# Patient Record
Sex: FEMALE | Race: BLACK OR AFRICAN AMERICAN | NOT HISPANIC OR LATINO | Employment: FULL TIME | URBAN - METROPOLITAN AREA
[De-identification: names, ages, dates, MRNs, and addresses within clinical notes are randomized per-mention and may not be internally consistent; named-entity substitution may affect disease eponyms.]

---

## 2019-05-17 ENCOUNTER — OFFICE VISIT (OUTPATIENT)
Dept: FAMILY MEDICINE CLINIC | Facility: CLINIC | Age: 49
End: 2019-05-17

## 2019-05-17 VITALS
HEIGHT: 66 IN | BODY MASS INDEX: 32.47 KG/M2 | HEART RATE: 60 BPM | WEIGHT: 202 LBS | SYSTOLIC BLOOD PRESSURE: 118 MMHG | DIASTOLIC BLOOD PRESSURE: 76 MMHG | TEMPERATURE: 98.1 F

## 2019-05-17 DIAGNOSIS — Z76.89 REFERRAL OF PATIENT: ICD-10-CM

## 2019-05-17 DIAGNOSIS — F17.210 CIGARETTE SMOKER: Primary | ICD-10-CM

## 2019-05-17 DIAGNOSIS — J45.909 MILD ASTHMA, UNSPECIFIED WHETHER COMPLICATED, UNSPECIFIED WHETHER PERSISTENT: ICD-10-CM

## 2019-05-17 DIAGNOSIS — E66.09 CLASS 1 OBESITY DUE TO EXCESS CALORIES WITHOUT SERIOUS COMORBIDITY WITH BODY MASS INDEX (BMI) OF 33.0 TO 33.9 IN ADULT: ICD-10-CM

## 2019-05-17 PROCEDURE — 99204 OFFICE O/P NEW MOD 45 MIN: CPT | Performed by: NURSE PRACTITIONER

## 2019-05-17 PROCEDURE — 99406 BEHAV CHNG SMOKING 3-10 MIN: CPT | Performed by: NURSE PRACTITIONER

## 2019-05-17 RX ORDER — BUPROPION HYDROCHLORIDE 75 MG/1
75 TABLET ORAL 2 TIMES DAILY
Qty: 60 TABLET | Refills: 3 | Status: SHIPPED | OUTPATIENT
Start: 2019-05-17 | End: 2020-02-06

## 2019-05-17 RX ORDER — ALBUTEROL SULFATE 90 UG/1
2 AEROSOL, METERED RESPIRATORY (INHALATION) EVERY 4 HOURS PRN
COMMUNITY
End: 2019-05-17 | Stop reason: SDUPTHER

## 2019-05-17 RX ORDER — MULTIPLE VITAMINS W/ MINERALS TAB 9MG-400MCG
1 TAB ORAL DAILY
COMMUNITY
End: 2021-12-30

## 2019-05-17 RX ORDER — LANOLIN ALCOHOL/MO/W.PET/CERES
1000 CREAM (GRAM) TOPICAL DAILY
COMMUNITY

## 2019-05-17 NOTE — PROGRESS NOTES
Francisca Adkins presents today to establish care.    Chief Complaint   Patient presents with   • Establish Care     New patient she comes in today to discuss labs that she had done at a health screening at work.        HPI   Francisca today to establish care.  Recently had lab work completed at her work and wanted to review results.  There were some concerning elevations that she is very worried about.  She is accompanied by daughter-in-law and cousin and grandson at bedside.    Currently no concerns today other than she is a cigarette smoker and would like to quit smoking.  She does have a history of mild asthma that she would like an albuterol inhaler refilled.  Occasionally has to use her albuterol inhaler especially with seasonal allergies.  Currently is taking a multivitamin, B12 vitamin, and hair skin and nails vitamin.    Cigarette smoker  Chronic.  Stable.  Has tried to quit in the past with Lalitha, Chantix, and cold turkey.  Did well with Chantix for 2 months but it was too expensive.  She is willing to try anything to help her quit.  She is ready to quit.  Lives with her aunt who is a smoker and does not want to quit.  States that she has problems with cravings, typically smokes during her lunch break and craves her cigarette right before lunch.  When she was on Chantix she did not have these cravings and did well.    Health Maintenance:  -Sees opthalmology regularly, requesting dental referral.  -not currently sexually active  -Hx of tubal ligation  -Use of seatbelt 100% of the time  -Smoke/CO detector working in home  -smoker; ready to quit  -Vaccines UTD- refused Tdap today        PHQ-2/PHQ-9 Depression Screening 5/17/2019   Little interest or pleasure in doing things 0   Feeling down, depressed, or hopeless 0   Total Score 0       Review of Systems   Constitutional: Negative for activity change, fatigue, unexpected weight gain and unexpected weight loss.   HENT: Negative for congestion.    Eyes:  Negative for blurred vision and visual disturbance.   Respiratory: Negative for chest tightness, shortness of breath and wheezing.    Cardiovascular: Negative for chest pain, palpitations and leg swelling.   Gastrointestinal: Negative for constipation, diarrhea, nausea, vomiting and GERD.   Musculoskeletal: Negative for arthralgias.   Neurological: Negative for dizziness, light-headedness, headache and confusion.   Psychiatric/Behavioral: Positive for stress. Negative for depressed mood. The patient is not nervous/anxious.    All other systems reviewed and are negative.       Past Medical History:   Diagnosis Date   • Asthma    • Depression    • GERD (gastroesophageal reflux disease)    • Headache    • Ovarian cyst         Past Surgical History:   Procedure Laterality Date   • BARIATRIC SURGERY     • CHOLECYSTECTOMY     • TONSILLECTOMY     • TUBAL ABDOMINAL LIGATION          Family History   Problem Relation Age of Onset   • Arthritis Mother    • Diabetes Paternal Aunt    • Heart attack Maternal Grandmother    • Hypertension Maternal Grandmother    • Stroke Maternal Grandmother         Social History     Socioeconomic History   • Marital status:      Spouse name: Not on file   • Number of children: Not on file   • Years of education: Not on file   • Highest education level: Not on file   Occupational History     Employer: [employer]   Tobacco Use   • Smoking status: Current Every Day Smoker     Packs/day: 1.00   • Smokeless tobacco: Never Used   Substance and Sexual Activity   • Alcohol use: Yes     Alcohol/week: 1.8 oz     Types: 3 Shots of liquor per week   • Drug use: No        Current Outpatient Medications on File Prior to Visit   Medication Sig Dispense Refill   • Multiple Vitamins-Minerals (HAIR SKIN AND NAILS FORMULA PO) Take 2 doses by mouth Daily.     • Multiple Vitamins-Minerals (MULTIVITAMIN WITH MINERALS) tablet tablet Take 1 tablet by mouth Daily.     • vitamin B-12 (CYANOCOBALAMIN) 1000  "MCG tablet Take 1,000 mcg by mouth Daily.     • [DISCONTINUED] albuterol sulfate  (90 Base) MCG/ACT inhaler Inhale 2 puffs Every 4 (Four) Hours As Needed.       No current facility-administered medications on file prior to visit.        No Known Allergies     Visit Vitals  /76   Pulse 60   Temp 98.1 °F (36.7 °C)   Ht 166.4 cm (65.5\")   Wt 91.6 kg (202 lb)   BMI 33.10 kg/m²        Physical Exam   Constitutional: She is oriented to person, place, and time. Vital signs are normal. She appears well-developed and well-nourished. She appears overweight.   HENT:   Head: Normocephalic.   Right Ear: Hearing, tympanic membrane, external ear and ear canal normal.   Left Ear: Hearing, tympanic membrane, external ear and ear canal normal.   Nose: Nose normal.   Mouth/Throat: Uvula is midline and oropharynx is clear and moist.   Eyes: Lids are normal.   Cardiovascular: Normal rate, regular rhythm, normal heart sounds and intact distal pulses.   Pulmonary/Chest: Effort normal and breath sounds normal.   Abdominal: Soft. Bowel sounds are normal.   Musculoskeletal: Normal range of motion.   Lymphadenopathy:        Head (right side): No submental, no submandibular, no tonsillar, no preauricular, no posterior auricular and no occipital adenopathy present.        Head (left side): No submental, no submandibular, no tonsillar, no preauricular, no posterior auricular and no occipital adenopathy present.     She has no cervical adenopathy.   Neurological: She is alert and oriented to person, place, and time. GCS eye subscore is 4. GCS verbal subscore is 5. GCS motor subscore is 6.   Skin: Skin is warm, dry and intact.   Psychiatric: She has a normal mood and affect. Her speech is normal and behavior is normal. Judgment and thought content normal.   Nursing note and vitals reviewed.       No results found for this or any previous visit.     Assessment/Plan  Francisca was seen today for establish care.    Diagnoses and all " orders for this visit:    Cigarette smoker  -     buPROPion (WELLBUTRIN) 75 MG tablet; Take 1 tablet by mouth 2 (Two) Times a Day.  -Smoking cessation completed.  Counseling was given to Francisca Adkins for the following topics: smoking cessation . Total time of the encounter was 30 minutes and 8 minutes was spent counseling.,    Mild asthma, unspecified whether complicated, unspecified whether persistent  -     albuterol (PROAIR RESPICLICK) 108 (90 Base) MCG/ACT inhaler; Inhale 2 puffs Every 4 (Four) Hours As Needed for Shortness of Air.    Class 1 obesity due to excess calories without serious comorbidity with body mass index (BMI) of 33.0 to 33.9 in adult  Patient's Body mass index is 33.1 kg/m². BMI is above normal parameters. Recommendations include: educational material, exercise counseling, nutrition counseling and pharmacological intervention.  -History of bariatric surgery.  Got down to 130 pounds.  Recently gained about 50 pounds in the last year when she began working night shift.    Referral of patient  -     Ambulatory Referral to Dentistry        I have reviewed lab results from her work, I see nothing concerning, overall she looks good.  They completed a CBC with differential, CMP, TSH, and lipid panel.  All within normal limits.  I have reviewed and reassured lab work and vital signs with her.    Return in about 3 months (around 8/17/2019) for Annual WWE with pap.

## 2019-05-17 NOTE — PATIENT INSTRUCTIONS
Mediterranean Diet  A Mediterranean diet refers to food and lifestyle choices that are based on the traditions of countries located on the [place]. This way of eating has been shown to help prevent certain conditions and improve outcomes for people who have chronic diseases, like kidney disease and heart disease.  What are tips for following this plan?  Lifestyle  · Cook and eat meals together with your family, when possible.  · Drink enough fluid to keep your urine clear or pale yellow.  · Be physically active every day. This includes:  ? Aerobic exercise like running or swimming.  ? Leisure activities like gardening, walking, or housework.  · Get 7-8 hours of sleep each night.  · If recommended by your health care provider, drink red wine in moderation. This means 1 glass a day for nonpregnant women and 2 glasses a day for men. A glass of wine equals 5 oz (150 mL).  Reading food labels  · Check the serving size of packaged foods. For foods such as rice and pasta, the serving size refers to the amount of cooked product, not dry.  · Check the total fat in packaged foods. Avoid foods that have saturated fat or trans fats.  · Check the ingredients list for added sugars, such as corn syrup.  Shopping  · At the grocery store, buy most of your food from the areas near the walls of the store. This includes:  ? Fresh fruits and vegetables (produce).  ? Grains, beans, nuts, and seeds. Some of these may be available in unpackaged forms or large amounts (in bulk).  ? Fresh seafood.  ? Poultry and eggs.  ? Low-fat dairy products.  · Buy whole ingredients instead of prepackaged foods.  · Buy fresh fruits and vegetables in-season from local farmers markets.  · Buy frozen fruits and vegetables in resealable bags.  · If you do not have access to quality fresh seafood, buy precooked frozen shrimp or canned fish, such as tuna, salmon, or sardines.  · Buy small amounts of raw or cooked vegetables, salads, or olives from the  deli or salad bar at your store.  · Stock your pantry so you always have certain foods on hand, such as olive oil, canned tuna, canned tomatoes, rice, pasta, and beans.  Cooking  · Cook foods with extra-virgin olive oil instead of using butter or other vegetable oils.  · Have meat as a side dish, and have vegetables or grains as your main dish. This means having meat in small portions or adding small amounts of meat to foods like pasta or stew.  · Use beans or vegetables instead of meat in common dishes like chili or lasagna.  · [place] with different cooking methods. Try roasting or broiling vegetables instead of steaming or sautéeing them.  · Add frozen vegetables to soups, stews, pasta, or rice.  · Add nuts or seeds for added healthy fat at each meal. You can add these to yogurt, salads, or vegetable dishes.  · Marinate fish or vegetables using olive oil, lemon juice, garlic, and fresh herbs.  Meal planning  · Plan to eat 1 vegetarian meal one day each week. Try to work up to 2 vegetarian meals, if possible.  · Eat seafood 2 or more times a week.  · Have healthy snacks readily available, such as:  ? Vegetable sticks with hummus.  ? Greek yogurt.  ? Fruit and nut trail mix.  · Eat balanced meals throughout the week. This includes:  ? Fruit: 2-3 servings a day  ? Vegetables: 4-5 servings a day  ? Low-fat dairy: 2 servings a day  ? Fish, poultry, or lean meat: 1 serving a day  ? Beans and legumes: 2 or more servings a week  ? Nuts and seeds: 1-2 servings a day  ? Whole grains: 6-8 servings a day  ? Extra-virgin olive oil: 3-4 servings a day  · Limit red meat and sweets to only a few servings a month  What are my food choices?  · Mediterranean diet  ? Recommended  ? Grains: Whole-grain pasta. Brown rice. Bulgar wheat. Polenta. Couscous. Whole-wheat bread. Oatmeal. Quinoa.  ? Vegetables: Artichokes. Beets. Broccoli. Cabbage. Carrots. Eggplant. Green beans. Chard. Kale. Spinach. Onions. Leeks. Peas. Squash.  Tomatoes. Peppers. Radishes.  ? Fruits: Apples. Apricots. Avocado. Berries. Bananas. Cherries. Dates. Figs. Grapes. Ning. Melon. Oranges. Peaches. Plums. Pomegranate.  ? Meats and other protein foods: Beans. Almonds. Sunflower seeds. Pine nuts. Peanuts. Cod. [place]. Scallops. Shrimp. Tuna. Tilapia. Clams. Oysters. Eggs.  ? Dairy: Low-fat milk. Cheese. Greek yogurt.  ? Beverages: Water. Red wine. Herbal tea.  ? Fats and oils: Extra virgin olive oil. Avocado oil. Grape seed oil.  ? Sweets and desserts: Greek yogurt with honey. Baked apples. Poached pears. Trail mix.  ? Seasoning and other foods: Basil. Cilantro. Coriander. Cumin. Mint. Parsley. [place]. Rosemary. Tarragon. Garlic. Oregano. Thyme. Pepper. Balsalmic vinegar. Tahini. Hummus. Tomato sauce. Olives. Mushrooms.  ? Limit these  ? Grains: Prepackaged pasta or rice dishes. Prepackaged cereal with added sugar.  ? Vegetables: Deep fried potatoes (french fries).  ? Fruits: Fruit canned in syrup.  ? Meats and other protein foods: Beef. Pork. Lamb. Poultry with skin. Hot dogs. [place].  ? Dairy: Ice cream. Sour cream. Whole milk.  ? Beverages: Juice. Sugar-sweetened soft drinks. Beer. Liquor and spirits.  ? Fats and oils: Butter. Canola oil. Vegetable oil. Beef fat (tallow). Lard.  ? Sweets and desserts: Cookies. Cakes. Pies. Candy.  ? Seasoning and other foods: Mayonnaise. Premade sauces and marinades.  ? The items listed may not be a complete list. Talk with your dietitian about what dietary choices are right for you.  Summary  · The Mediterranean diet includes both food and lifestyle choices.  · Eat a variety of fresh fruits and vegetables, beans, nuts, seeds, and whole grains.  · Limit the amount of red meat and sweets that you eat.  · Talk with your health care provider about whether it is safe for you to drink red wine in moderation. This means 1 glass a day for nonpregnant women and 2 glasses a day for men. A glass of wine equals 5 oz (150 mL).  This information  is not intended to replace advice given to you by your health care provider. Make sure you discuss any questions you have with your health care provider.  Document Released: 08/10/2017 Document Revised: 09/12/2017 Document Reviewed: 08/10/2017  [employer] Interactive Patient Education © 2019 [employer]      Steps to Quit Smoking  Smoking tobacco can be harmful to your health and can affect almost every organ in your body. Smoking puts you, and those around you, at risk for developing many serious chronic diseases. Quitting smoking is difficult, but it is one of the best things that you can do for your health. It is never too late to quit.  What are the benefits of quitting smoking?  When you quit smoking, you lower your risk of developing serious diseases and conditions, such as:  · Lung cancer or lung disease, such as COPD.  · Heart disease.  · Stroke.  · Heart attack.  · Infertility.  · Osteoporosis and bone fractures.    Additionally, symptoms such as coughing, wheezing, and shortness of breath may get better when you quit. You may also find that you get sick less often because your body is stronger at fighting off colds and infections. If you are pregnant, quitting smoking can help to reduce your chances of having a baby of low birth weight.  How do I get ready to quit?  When you decide to quit smoking, create a plan to make sure that you are successful. Before you quit:  · Pick a date to quit. Set a date within the next two weeks to give you time to prepare.  · Write down the reasons why you are quitting. Keep this list in places where you will see it often, such as on your bathroom mirror or in your car or wallet.  · Identify the people, places, things, and activities that make you want to smoke (triggers) and avoid them. Make sure to take these actions:  ? Throw away all cigarettes at home, at work, and in your car.  ? Throw away smoking accessories, such as ashtrays and lighters.  ? Clean your car and make  sure to empty the ashtray.  ? Clean your home, including curtains and carpets.  · Tell your family, friends, and coworkers that you are quitting. Support from your loved ones can make quitting easier.  · Talk with your health care provider about your options for quitting smoking.  · Find out what treatment options are covered by your health insurance.    What strategies can I use to quit smoking?  Talk with your healthcare provider about different strategies to quit smoking. Some strategies include:  · Quitting smoking altogether instead of gradually lessening how much you smoke over a period of time. Research shows that quitting “cold turkey” is more successful than gradually quitting.  · Attending in-person counseling to help you build problem-solving skills. You are more likely to have success in quitting if you attend several counseling sessions. Even short sessions of 10 minutes can be effective.  · Finding resources and support systems that can help you to quit smoking and remain smoke-free after you quit. These resources are most helpful when you use them often. They can include:  ? Online chats with a counselor.  ? Telephone quitlines.  ? Printed self-help materials.  ? Support groups or group counseling.  ? Text messaging programs.  ? Mobile phone applications.  · Taking medicines to help you quit smoking. (If you are pregnant or breastfeeding, talk with your health care provider first.) Some medicines contain nicotine and some do not. Both types of medicines help with cravings, but the medicines that include nicotine help to relieve withdrawal symptoms. Your health care provider may recommend:  ? Nicotine patches, gum, or lozenges.  ? Nicotine inhalers or sprays.  ? Non-nicotine medicine that is taken by mouth.    Talk with your health care provider about combining strategies, such as taking medicines while you are also receiving in-person counseling. Using these two strategies together makes you more  likely to succeed in quitting than if you used either strategy on its own.  If you are pregnant or breastfeeding, talk with your health care provider about finding counseling or other support strategies to quit smoking. Do not take medicine to help you quit smoking unless told to do so by your health care provider.  What things can I do to make it easier to quit?  Quitting smoking might feel overwhelming at first, but there is a lot that you can do to make it easier. Take these important actions:  · Reach out to your family and friends and ask that they support and encourage you during this time. Call telephone quitlines, reach out to support groups, or work with a counselor for support.  · Ask people who smoke to avoid smoking around you.  · Avoid places that trigger you to smoke, such as bars, parties, or smoke-break areas at work.  · Spend time around people who do not smoke.  · Lessen stress in your life, because stress can be a smoking trigger for some people. To lessen stress, try:  ? Exercising regularly.  ? Deep-breathing exercises.  ? Yoga.  ? Meditating.  ? Performing a body scan. This involves closing your eyes, scanning your body from head to toe, and noticing which parts of your body are particularly tense. Purposefully relax the muscles in those areas.  · Download or purchase mobile phone or tablet apps (applications) that can help you stick to your quit plan by providing reminders, tips, and encouragement. There are many free apps, such as QuitGuide from the CDC (Centers for Disease Control and Prevention). You can find other support for quitting smoking (smoking cessation) through smokefree.gov and other websites.    How will I feel when I quit smoking?  Within the first 24 hours of quitting smoking, you may start to feel some withdrawal symptoms. These symptoms are usually most noticeable 2-3 days after quitting, but they usually do not last beyond 2-3 weeks. Changes or symptoms that you might  experience include:  · Mood swings.  · Restlessness, anxiety, or irritation.  · Difficulty concentrating.  · Dizziness.  · Strong cravings for sugary foods in addition to nicotine.  · Mild weight gain.  · Constipation.  · Nausea.  · Coughing or a sore throat.  · Changes in how your medicines work in your body.  · A depressed mood.  · Difficulty sleeping (insomnia).    After the first 2-3 weeks of quitting, you may start to notice more positive results, such as:  · Improved sense of smell and taste.  · Decreased coughing and sore throat.  · Slower heart rate.  · Lower blood pressure.  · Clearer skin.  · The ability to breathe more easily.  · Fewer sick days.    Quitting smoking is very challenging for most people. Do not get discouraged if you are not successful the first time. Some people need to make many attempts to quit before they achieve long-term success. Do your best to stick to your quit plan, and talk with your health care provider if you have any questions or concerns.  This information is not intended to replace advice given to you by your health care provider. Make sure you discuss any questions you have with your health care provider.  Document Released: 12/12/2002 Document Revised: 07/24/2018 Document Reviewed: 05/03/2016  [employer] Interactive Patient Education © 2019 [employer]

## 2019-06-14 ENCOUNTER — TELEPHONE (OUTPATIENT)
Dept: FAMILY MEDICINE CLINIC | Facility: CLINIC | Age: 49
End: 2019-06-14

## 2019-06-14 NOTE — TELEPHONE ENCOUNTER
REC MEDICAL RECORDS BY FAX FROM [facility]. PLACED IN CINDY'S FOLDER UP FRONT.

## 2019-07-09 ENCOUNTER — OFFICE VISIT (OUTPATIENT)
Dept: FAMILY MEDICINE CLINIC | Facility: CLINIC | Age: 49
End: 2019-07-09

## 2019-07-09 VITALS
DIASTOLIC BLOOD PRESSURE: 62 MMHG | WEIGHT: 204 LBS | SYSTOLIC BLOOD PRESSURE: 110 MMHG | HEIGHT: 66 IN | OXYGEN SATURATION: 100 % | BODY MASS INDEX: 32.78 KG/M2 | HEART RATE: 78 BPM

## 2019-07-09 DIAGNOSIS — R07.9 CHEST PAIN, UNSPECIFIED TYPE: Primary | ICD-10-CM

## 2019-07-09 DIAGNOSIS — I95.9 HYPOTENSION, UNSPECIFIED HYPOTENSION TYPE: ICD-10-CM

## 2019-07-09 PROCEDURE — 99214 OFFICE O/P EST MOD 30 MIN: CPT | Performed by: PHYSICIAN ASSISTANT

## 2019-07-09 NOTE — PROGRESS NOTES
"    Chief Complaint   Patient presents with   • Dizziness     f/u from ER - chest pain SOB - [place]       HPI     Francisca Adkins is a pleasant 48 y.o. female who presents for evaluation of \"chief complaint.\"  Patient was evaluated at  ER yesterday for an episode of chest pain, soa, and lightheadedness that began at work. She works for [employer] and was not doing any strenuous activity at the time of her symptoms. This lasted 15-20 minutes then resolved prior to arriving at the ER. Her ER workup was benign and included EKGs, ddimer, serial cardiac enzymes, and a chest x-ray. She had eaten some popcorn about 45 minutes before her symptoms but besides this had not had much to eat. She stopped smoking 2 months ago after starting wellbutrin. She denies GERD or a return in her symptoms. Yesterday she woke up with some tingling in legs that has resolved. She feels mildly tired today. She was lightheaded this morning but has no current symptoms. She admits to very little fluid intake today.     Past Medical History:   Diagnosis Date   • Asthma    • Depression    • GERD (gastroesophageal reflux disease)    • H/O gastric bypass    • Headache    • On Chantix therapy     history of use of chantix   • Ovarian cyst        Past Surgical History:   Procedure Laterality Date   • BARIATRIC SURGERY     • CHOLECYSTECTOMY     • TONSILLECTOMY     • TUBAL ABDOMINAL LIGATION         Family History   Problem Relation Age of Onset   • Arthritis Mother    • Diabetes Paternal Aunt    • Heart attack Maternal Grandmother    • Hypertension Maternal Grandmother    • Stroke Maternal Grandmother        Social History     Socioeconomic History   • Marital status:      Spouse name: Not on file   • Number of children: Not on file   • Years of education: Not on file   • Highest education level: Not on file   Occupational History     Employer: [employer]   Tobacco Use   • Smoking status: Current Every Day Smoker     Packs/day: " 1.00   • Smokeless tobacco: Never Used   Substance and Sexual Activity   • Alcohol use: Yes     Alcohol/week: 1.8 oz     Types: 3 Shots of liquor per week   • Drug use: No       No Known Allergies    ROS    Review of Systems   Constitutional: Positive for fatigue.   Respiratory: Negative for shortness of breath.    Cardiovascular: Negative for chest pain.   Gastrointestinal: Negative for nausea, vomiting and GERD.   Neurological: Positive for light-headedness.       Vitals:    07/09/19 1703   BP: 110/62   Pulse: 78   SpO2: 100%         Current Outpatient Medications:   •  albuterol (PROAIR RESPICLICK) 108 (90 Base) MCG/ACT inhaler, Inhale 2 puffs Every 4 (Four) Hours As Needed for Shortness of Air., Disp: 1 inhaler, Rfl: 11  •  buPROPion (WELLBUTRIN) 75 MG tablet, Take 1 tablet by mouth 2 (Two) Times a Day., Disp: 60 tablet, Rfl: 3  •  Multiple Vitamins-Minerals (HAIR SKIN AND NAILS FORMULA PO), Take 2 doses by mouth Daily., Disp: , Rfl:   •  Multiple Vitamins-Minerals (MULTIVITAMIN WITH MINERALS) tablet tablet, Take 1 tablet by mouth Daily., Disp: , Rfl:   •  vitamin B-12 (CYANOCOBALAMIN) 1000 MCG tablet, Take 1,000 mcg by mouth Daily., Disp: , Rfl:     PE    Physical Exam   Constitutional: She appears well-developed and well-nourished. No distress.   HENT:   Head: Normocephalic.   Cardiovascular: Normal rate, regular rhythm and normal heart sounds.   No murmur heard.  Pulmonary/Chest: Effort normal and breath sounds normal.   Neurological: She is alert.   Psychiatric: She has a normal mood and affect. Her behavior is normal.   Vitals reviewed.       A/P    Problem List Items Addressed This Visit     None      Visit Diagnoses     Chest pain, unspecified type    -  Primary  -Atypical. Benign ER workup. Possible GI source.  -Recommend patient follow-up with Mercedes in 2 weeks      Hypotension, unspecified hypotension type      -Standing BP for me in the 90s systolic.   -Recommended patient increase her water intake           Plan of care reviewed with patient at the conclusion of today's visit. Education was provided regarding diagnosis, management and any prescribed or recommended OTC medications.  Patient verbalizes understanding of and agreement with management plan.        MEG Lang

## 2020-01-09 ENCOUNTER — APPOINTMENT (OUTPATIENT)
Dept: LAB | Facility: HOSPITAL | Age: 50
End: 2020-01-09

## 2020-01-09 ENCOUNTER — OFFICE VISIT (OUTPATIENT)
Dept: FAMILY MEDICINE CLINIC | Facility: CLINIC | Age: 50
End: 2020-01-09

## 2020-01-09 VITALS
BODY MASS INDEX: 35.26 KG/M2 | DIASTOLIC BLOOD PRESSURE: 64 MMHG | HEIGHT: 66 IN | WEIGHT: 219.4 LBS | OXYGEN SATURATION: 98 % | SYSTOLIC BLOOD PRESSURE: 120 MMHG | HEART RATE: 76 BPM | TEMPERATURE: 98.1 F

## 2020-01-09 DIAGNOSIS — Z13.220 SCREENING FOR LIPID DISORDERS: ICD-10-CM

## 2020-01-09 DIAGNOSIS — T78.3XXD ANGIOEDEMA, SUBSEQUENT ENCOUNTER: ICD-10-CM

## 2020-01-09 DIAGNOSIS — H61.23 BILATERAL IMPACTED CERUMEN: ICD-10-CM

## 2020-01-09 DIAGNOSIS — T78.40XD ALLERGIC REACTION, SUBSEQUENT ENCOUNTER: Primary | ICD-10-CM

## 2020-01-09 DIAGNOSIS — L50.9 URTICARIA: ICD-10-CM

## 2020-01-09 DIAGNOSIS — Z13.29 SCREENING FOR THYROID DISORDER: ICD-10-CM

## 2020-01-09 DIAGNOSIS — L50.9 HIVES: ICD-10-CM

## 2020-01-09 DIAGNOSIS — E55.9 VITAMIN D DEFICIENCY: ICD-10-CM

## 2020-01-09 DIAGNOSIS — R63.5 WEIGHT GAIN: ICD-10-CM

## 2020-01-09 DIAGNOSIS — Z13.0 SCREENING FOR DEFICIENCY ANEMIA: ICD-10-CM

## 2020-01-09 DIAGNOSIS — Z13.1 SCREENING FOR DIABETES MELLITUS: ICD-10-CM

## 2020-01-09 LAB
25(OH)D3 SERPL-MCNC: 13.4 NG/ML (ref 30–100)
ALBUMIN SERPL-MCNC: 4.1 G/DL (ref 3.5–5.2)
ALBUMIN/GLOB SERPL: 1.1 G/DL
ALP SERPL-CCNC: 123 U/L (ref 39–117)
ALT SERPL W P-5'-P-CCNC: 20 U/L (ref 1–33)
ANION GAP SERPL CALCULATED.3IONS-SCNC: 11.9 MMOL/L (ref 5–15)
AST SERPL-CCNC: 19 U/L (ref 1–32)
BASOPHILS # BLD AUTO: 0.03 10*3/MM3 (ref 0–0.2)
BASOPHILS NFR BLD AUTO: 0.2 % (ref 0–1.5)
BILIRUB SERPL-MCNC: 0.2 MG/DL (ref 0.2–1.2)
BUN BLD-MCNC: 17 MG/DL (ref 6–20)
BUN/CREAT SERPL: 22.7 (ref 7–25)
CALCIUM SPEC-SCNC: 9.3 MG/DL (ref 8.6–10.5)
CHLORIDE SERPL-SCNC: 99 MMOL/L (ref 98–107)
CHOLEST SERPL-MCNC: 152 MG/DL (ref 0–200)
CO2 SERPL-SCNC: 28.1 MMOL/L (ref 22–29)
CREAT BLD-MCNC: 0.75 MG/DL (ref 0.57–1)
DEPRECATED RDW RBC AUTO: 43.5 FL (ref 37–54)
EOSINOPHIL # BLD AUTO: 0.11 10*3/MM3 (ref 0–0.4)
EOSINOPHIL NFR BLD AUTO: 0.8 % (ref 0.3–6.2)
ERYTHROCYTE [DISTWIDTH] IN BLOOD BY AUTOMATED COUNT: 13 % (ref 12.3–15.4)
GFR SERPL CREATININE-BSD FRML MDRD: 100 ML/MIN/1.73
GLOBULIN UR ELPH-MCNC: 3.7 GM/DL
GLUCOSE BLD-MCNC: 93 MG/DL (ref 65–99)
HCT VFR BLD AUTO: 42.8 % (ref 34–46.6)
HDLC SERPL-MCNC: 63 MG/DL (ref 40–60)
HGB BLD-MCNC: 13.9 G/DL (ref 12–15.9)
IMM GRANULOCYTES # BLD AUTO: 0.04 10*3/MM3 (ref 0–0.05)
IMM GRANULOCYTES NFR BLD AUTO: 0.3 % (ref 0–0.5)
LDLC SERPL CALC-MCNC: 72 MG/DL (ref 0–100)
LDLC/HDLC SERPL: 1.14 {RATIO}
LYMPHOCYTES # BLD AUTO: 4.11 10*3/MM3 (ref 0.7–3.1)
LYMPHOCYTES NFR BLD AUTO: 28.7 % (ref 19.6–45.3)
MCH RBC QN AUTO: 29.9 PG (ref 26.6–33)
MCHC RBC AUTO-ENTMCNC: 32.5 G/DL (ref 31.5–35.7)
MCV RBC AUTO: 92 FL (ref 79–97)
MONOCYTES # BLD AUTO: 0.96 10*3/MM3 (ref 0.1–0.9)
MONOCYTES NFR BLD AUTO: 6.7 % (ref 5–12)
NEUTROPHILS # BLD AUTO: 9.08 10*3/MM3 (ref 1.7–7)
NEUTROPHILS NFR BLD AUTO: 63.3 % (ref 42.7–76)
NRBC BLD AUTO-RTO: 0 /100 WBC (ref 0–0.2)
PLATELET # BLD AUTO: 210 10*3/MM3 (ref 140–450)
PMV BLD AUTO: 12 FL (ref 6–12)
POTASSIUM BLD-SCNC: 3.9 MMOL/L (ref 3.5–5.2)
PROT SERPL-MCNC: 7.8 G/DL (ref 6–8.5)
RBC # BLD AUTO: 4.65 10*6/MM3 (ref 3.77–5.28)
SODIUM BLD-SCNC: 139 MMOL/L (ref 136–145)
TRIGL SERPL-MCNC: 85 MG/DL (ref 0–150)
TSH SERPL DL<=0.05 MIU/L-ACNC: 1.85 UIU/ML (ref 0.27–4.2)
VLDLC SERPL-MCNC: 17 MG/DL (ref 5–40)
WBC NRBC COR # BLD: 14.33 10*3/MM3 (ref 3.4–10.8)

## 2020-01-09 PROCEDURE — 99214 OFFICE O/P EST MOD 30 MIN: CPT | Performed by: NURSE PRACTITIONER

## 2020-01-09 PROCEDURE — 85025 COMPLETE CBC W/AUTO DIFF WBC: CPT | Performed by: NURSE PRACTITIONER

## 2020-01-09 PROCEDURE — 80053 COMPREHEN METABOLIC PANEL: CPT | Performed by: NURSE PRACTITIONER

## 2020-01-09 PROCEDURE — 80061 LIPID PANEL: CPT | Performed by: NURSE PRACTITIONER

## 2020-01-09 PROCEDURE — 82306 VITAMIN D 25 HYDROXY: CPT | Performed by: NURSE PRACTITIONER

## 2020-01-09 PROCEDURE — 84443 ASSAY THYROID STIM HORMONE: CPT | Performed by: NURSE PRACTITIONER

## 2020-01-09 PROCEDURE — 69210 REMOVE IMPACTED EAR WAX UNI: CPT | Performed by: NURSE PRACTITIONER

## 2020-01-09 RX ORDER — HYDROXYZINE HYDROCHLORIDE 25 MG/1
25 TABLET, FILM COATED ORAL EVERY 6 HOURS PRN
Qty: 30 TABLET | Refills: 5 | Status: SHIPPED | OUTPATIENT
Start: 2020-01-09 | End: 2021-04-20

## 2020-01-09 NOTE — PROGRESS NOTES
Chief Complaint   Patient presents with   • Urticaria     hives at night    • Weight Gain   • lips swelling     swelling started last night. mwent to the ED at [facility] presents today with concerns of allergic reaction.  States that she has had no changes in food, detergent, environment, etc.  Went on a cruise 3 months ago and 3 weeks after cruise began experiencing hives. Does not think they are related.    Hives occurred from what she assumed was stress- broke out on abdomen but then she sat down and calmed down and hives went away.  Since, she usually has one break out of hives per day and it usually occurs when she wakes up. So now she is not thinking it's related to stress.    Yesterday, was sitting watching TV and lips swelled. She went immediately to ER. They treated with steroids, benadryl, and pepcid and gave her scripts for these medications but she has not filled them yet.    Allergic Reaction   This is a new problem. The current episode started 2 days ago. The problem occurs daily. The problem has been gradually worsening since onset. The problem is moderate. It is unknown what she was exposed to. The time of exposure is not relevant (no exposure). The exposure occurred at home (unsure of location). Associated symptoms include itching and a rash. Pertinent negatives include no abdominal pain, chest pain, chest pressure, coughing, diarrhea, difficulty breathing, drooling, eye itching, eye redness, eye watering, globus sensation, hyperventilation, stridor, trouble swallowing, vomiting or wheezing. Swelling is present on the lips (no swelling present today). Past treatments include diphenhydramine and oral corticosteroid (went to ER ). The treatment provided significant relief. There is no history of asthma, atopic dermatitis, food allergies, medication allergies or seasonal allergies.   She is very anxious about throat swelling and closing. Doesn't know what is going on.    Also has concerns  of weight gain. Gained over 100 lbs in the last year. Lives a sedentary lifestyle and doesn't make the healthiest choices but doesn't understand the weight gain and why it hadn't happened before.      Review of Systems   Constitutional: Positive for unexpected weight gain.   HENT: Negative for drooling and trouble swallowing.    Eyes: Negative for redness and itching.   Respiratory: Negative for cough, chest tightness, wheezing and stridor.    Cardiovascular: Negative for chest pain.   Gastrointestinal: Negative for abdominal pain, diarrhea and vomiting.   Skin: Positive for itching and rash.   Allergic/Immunologic: Negative for environmental allergies and food allergies.   Psychiatric/Behavioral: Positive for stress. Negative for depressed mood. The patient is nervous/anxious.         Current Outpatient Medications on File Prior to Visit   Medication Sig Dispense Refill   • albuterol (PROAIR RESPICLICK) 108 (90 Base) MCG/ACT inhaler Inhale 2 puffs Every 4 (Four) Hours As Needed for Shortness of Air. 1 inhaler 11   • buPROPion (WELLBUTRIN) 75 MG tablet Take 1 tablet by mouth 2 (Two) Times a Day. 60 tablet 3   • Multiple Vitamins-Minerals (HAIR SKIN AND NAILS FORMULA PO) Take 2 doses by mouth Daily.     • Multiple Vitamins-Minerals (MULTIVITAMIN WITH MINERALS) tablet tablet Take 1 tablet by mouth Daily.     • vitamin B-12 (CYANOCOBALAMIN) 1000 MCG tablet Take 1,000 mcg by mouth Daily.       No current facility-administered medications on file prior to visit.        No Known Allergies    Past Medical History:   Diagnosis Date   • Asthma    • Depression    • GERD (gastroesophageal reflux disease)    • H/O gastric bypass    • Headache    • On Chantix therapy     history of use of chantix   • Ovarian cyst        Social History     Tobacco Use   Smoking Status Current Every Day Smoker   • Packs/day: 1.00   Smokeless Tobacco Never Used        Visit Vitals  /64 (BP Location: Right arm, Patient Position: Sitting, Cuff  "Size: Large Adult)   Pulse 76   Temp 98.1 °F (36.7 °C) (Temporal)   Ht 166.4 cm (65.5\")   Wt 99.5 kg (219 lb 6.4 oz)   SpO2 98%   Breastfeeding No   BMI 35.95 kg/m²        Physical Exam   Constitutional: She is oriented to person, place, and time. Vital signs are normal. She appears well-developed and well-nourished.   HENT:   Head: Normocephalic.   Right Ear: Hearing, tympanic membrane, external ear and ear canal normal.   Left Ear: Hearing, tympanic membrane, external ear and ear canal normal.   Nose: Nose normal.   Mouth/Throat: Uvula is midline and oropharynx is clear and moist.   Eyes: Lids are normal.   Cardiovascular: Normal rate, regular rhythm, normal heart sounds and intact distal pulses.   Pulmonary/Chest: Effort normal and breath sounds normal.   Abdominal: Soft. Bowel sounds are normal.   Musculoskeletal: Normal range of motion.   Lymphadenopathy:        Head (right side): No submental, no submandibular, no tonsillar, no preauricular, no posterior auricular and no occipital adenopathy present.        Head (left side): No submental, no submandibular, no tonsillar, no preauricular, no posterior auricular and no occipital adenopathy present.     She has no cervical adenopathy.   Neurological: She is alert and oriented to person, place, and time. GCS eye subscore is 4. GCS verbal subscore is 5. GCS motor subscore is 6.   Skin: Skin is warm, dry and intact. Rash noted. Rash is urticarial (not present today. I have reviewed pictures on her phone).   Psychiatric: She has a normal mood and affect. Her speech is normal and behavior is normal. Judgment and thought content normal.   Nursing note and vitals reviewed.      Ear Cerumen Removal  Date/Time: 1/9/2020 10:40 AM  Performed by: Mercedes Walls APRN  Authorized by: Mercedes Walls APRN     Anesthesia:  Local Anesthetic: none  Location: bilateral.  Patient tolerance: Patient tolerated the procedure well with no immediate " complications  Comments: Bilateral successful cerumen removal via irritation and curette.  Procedure type: instrumentation and irrigation            Assessment/Plan  Francisca was seen today for urticaria, weight gain and lips swelling.    Diagnoses and all orders for this visit:    Allergic reaction, subsequent encounter    Urticaria  -     hydrOXYzine (ATARAX) 25 MG tablet; Take 1 tablet by mouth Every 6 (Six) Hours As Needed for Itching or Allergies.    Angioedema, subsequent encounter  Hives  -     Ambulatory Referral to Allergy  -     hydrOXYzine (ATARAX) 25 MG tablet; Take 1 tablet by mouth Every 6 (Six) Hours As Needed for Itching or Allergies.    Weight gain  - will review labs. I do not think weight gain is priority today.    Screening for deficiency anemia  -     CBC & Differential; Future  Screening for diabetes mellitus  -     Comprehensive Metabolic Panel; Future  Screening for lipid disorders  -     Lipid Panel; Future  Screening for thyroid disorder  -     TSH Rfx On Abnormal To Free T4; Future  Vitamin D deficiency  -     Vitamin D 25 Hydroxy; Future    Bilateral impacted cerumen  -     Ear Cerumen Removal- successful    Education provided. Advised to get Rx filled as directed by ER.    Return in about 2 weeks (around 1/23/2020) for Follow-up for hives.

## 2020-01-10 DIAGNOSIS — E55.9 VITAMIN D DEFICIENCY: Primary | ICD-10-CM

## 2020-01-10 RX ORDER — ERGOCALCIFEROL 1.25 MG/1
50000 CAPSULE ORAL WEEKLY
Qty: 12 CAPSULE | Refills: 1 | Status: SHIPPED | OUTPATIENT
Start: 2020-01-10 | End: 2020-02-06

## 2020-02-06 ENCOUNTER — OFFICE VISIT (OUTPATIENT)
Dept: FAMILY MEDICINE CLINIC | Facility: CLINIC | Age: 50
End: 2020-02-06

## 2020-02-06 ENCOUNTER — LAB (OUTPATIENT)
Dept: LAB | Facility: HOSPITAL | Age: 50
End: 2020-02-06

## 2020-02-06 VITALS
BODY MASS INDEX: 35.81 KG/M2 | HEART RATE: 68 BPM | HEIGHT: 66 IN | DIASTOLIC BLOOD PRESSURE: 62 MMHG | TEMPERATURE: 96.3 F | OXYGEN SATURATION: 97 % | WEIGHT: 222.8 LBS | SYSTOLIC BLOOD PRESSURE: 116 MMHG

## 2020-02-06 DIAGNOSIS — J45.909 MILD ASTHMA, UNSPECIFIED WHETHER COMPLICATED, UNSPECIFIED WHETHER PERSISTENT: ICD-10-CM

## 2020-02-06 DIAGNOSIS — E66.9 OBESITY (BMI 35.0-39.9 WITHOUT COMORBIDITY): ICD-10-CM

## 2020-02-06 DIAGNOSIS — D72.9 NEUTROPHILIA: ICD-10-CM

## 2020-02-06 DIAGNOSIS — E55.9 VITAMIN D DEFICIENCY: ICD-10-CM

## 2020-02-06 DIAGNOSIS — F17.210 CIGARETTE SMOKER: ICD-10-CM

## 2020-02-06 DIAGNOSIS — E53.8 VITAMIN B12 DEFICIENCY: ICD-10-CM

## 2020-02-06 DIAGNOSIS — Z00.00 ANNUAL PHYSICAL EXAM: Primary | ICD-10-CM

## 2020-02-06 PROBLEM — T78.3XXA ANGIOEDEMA OF LIPS: Status: ACTIVE | Noted: 2020-01-08

## 2020-02-06 LAB
25(OH)D3 SERPL-MCNC: 21.6 NG/ML (ref 30–100)
BASOPHILS # BLD AUTO: 0.05 10*3/MM3 (ref 0–0.2)
BASOPHILS NFR BLD AUTO: 0.7 % (ref 0–1.5)
DEPRECATED RDW RBC AUTO: 42.7 FL (ref 37–54)
EOSINOPHIL # BLD AUTO: 0.45 10*3/MM3 (ref 0–0.4)
EOSINOPHIL NFR BLD AUTO: 6 % (ref 0.3–6.2)
ERYTHROCYTE [DISTWIDTH] IN BLOOD BY AUTOMATED COUNT: 13 % (ref 12.3–15.4)
HCT VFR BLD AUTO: 41.4 % (ref 34–46.6)
HGB BLD-MCNC: 13.7 G/DL (ref 12–15.9)
IMM GRANULOCYTES # BLD AUTO: 0 10*3/MM3 (ref 0–0.05)
IMM GRANULOCYTES NFR BLD AUTO: 0 % (ref 0–0.5)
LYMPHOCYTES # BLD AUTO: 2.92 10*3/MM3 (ref 0.7–3.1)
LYMPHOCYTES NFR BLD AUTO: 39.1 % (ref 19.6–45.3)
MCH RBC QN AUTO: 30.4 PG (ref 26.6–33)
MCHC RBC AUTO-ENTMCNC: 33.1 G/DL (ref 31.5–35.7)
MCV RBC AUTO: 92 FL (ref 79–97)
MONOCYTES # BLD AUTO: 0.6 10*3/MM3 (ref 0.1–0.9)
MONOCYTES NFR BLD AUTO: 8 % (ref 5–12)
NEUTROPHILS # BLD AUTO: 3.44 10*3/MM3 (ref 1.7–7)
NEUTROPHILS NFR BLD AUTO: 46.2 % (ref 42.7–76)
NRBC BLD AUTO-RTO: 0 /100 WBC (ref 0–0.2)
PLATELET # BLD AUTO: 222 10*3/MM3 (ref 140–450)
PMV BLD AUTO: 12.4 FL (ref 6–12)
RBC # BLD AUTO: 4.5 10*6/MM3 (ref 3.77–5.28)
VIT B12 BLD-MCNC: 743 PG/ML (ref 211–946)
WBC NRBC COR # BLD: 7.46 10*3/MM3 (ref 3.4–10.8)

## 2020-02-06 PROCEDURE — 82607 VITAMIN B-12: CPT

## 2020-02-06 PROCEDURE — 85025 COMPLETE CBC W/AUTO DIFF WBC: CPT

## 2020-02-06 PROCEDURE — 82306 VITAMIN D 25 HYDROXY: CPT

## 2020-02-06 PROCEDURE — 99396 PREV VISIT EST AGE 40-64: CPT | Performed by: NURSE PRACTITIONER

## 2020-02-06 RX ORDER — BUPROPION HYDROCHLORIDE 150 MG/1
150 TABLET ORAL DAILY
Qty: 30 TABLET | Refills: 5 | Status: SHIPPED | OUTPATIENT
Start: 2020-02-06 | End: 2021-04-20 | Stop reason: SDUPTHER

## 2020-02-06 RX ORDER — CETIRIZINE HYDROCHLORIDE 10 MG/1
1 TABLET ORAL DAILY
COMMUNITY
Start: 2020-01-14 | End: 2022-09-20

## 2020-02-06 NOTE — PROGRESS NOTES
Chief Complaint   Patient presents with   • Annual Exam       Francisca Adkins is a 49 y.o. female and is here for a comprehensive physical exam. The patient reports no problems.    Due to recent unknown angioedema-went to allergist.  Was found to have no allergies.  Allergist started her on Zyrtec daily and she has not had any problems with angioedema since.  She is very concerned that she does not know what caused lip and throat swelling.    Continues to work on smoking cessation.  States that Wellbutrin 75 mg twice a day was working okay then she went on vacation and picked up smoking again.  Would like to try Wellbutrin again.    Continues to struggle with obesity.  Has a history of gastric bypass several years ago.  She does not make healthy food choices and does not exercise.  Knows what she needs to do but has a hard time doing it.  Plans to begin exercising soon.       History:  LMP: No LMP recorded (lmp unknown). Patient has had an ablation.  Menopause at 49 years??  Last pap date: can't remember- - does not get them anymore. Still has uterus and cervix.  Abnormal pap? yes when she was younger  :  2  Para: 2    Do you take any herbs or supplements that were not prescribed by a doctor? no just B12  Are you taking calcium supplements? no  Are you taking aspirin daily? no      Health Habits:  Dental Exam. up to date  Eye Exam. up to date  Exercise: 0 times/week.  Current exercise activities include: none    Health Maintenance   Topic Date Due   • ANNUAL PHYSICAL  1973   • PAP SMEAR  05/15/2019   • PNEUMOCOCCAL VACCINE (19-64 MEDIUM RISK)  Discontinued   • INFLUENZA VACCINE  Discontinued   • TDAP/TD VACCINES  Discontinued       PMH, PSH, SocHx, FamHx, Allergies, and Medications: Reviewed and updated in the Visit Navigator.     No Known Allergies  Past Medical History:   Diagnosis Date   • Asthma    • Depression    • GERD (gastroesophageal reflux disease)    • H/O gastric bypass    •  Headache    • Ovarian cyst      Past Surgical History:   Procedure Laterality Date   • BARIATRIC SURGERY     • CHOLECYSTECTOMY     • TONSILLECTOMY     • TUBAL ABDOMINAL LIGATION       Social History     Socioeconomic History   • Marital status:      Spouse name: Not on file   • Number of children: Not on file   • Years of education: Not on file   • Highest education level: Not on file   Occupational History     Employer: [employer]   Tobacco Use   • Smoking status: Current Every Day Smoker     Packs/day: 1.00   • Smokeless tobacco: Never Used   Substance and Sexual Activity   • Alcohol use: Yes     Alcohol/week: 3.0 standard drinks     Types: 3 Shots of liquor per week   • Drug use: No     Family History   Problem Relation Age of Onset   • Arthritis Mother    • Diabetes Paternal Aunt    • Heart attack Maternal Grandmother    • Hypertension Maternal Grandmother    • Stroke Maternal Grandmother        Review of Systems  Review of Systems   Constitutional: Negative for activity change, chills and fatigue.   HENT: Negative for congestion, postnasal drip, rhinorrhea and sinus pressure.    Eyes: Negative for discharge and visual disturbance.   Respiratory: Negative for chest tightness, shortness of breath and wheezing.    Cardiovascular: Negative for chest pain, palpitations and leg swelling.   Gastrointestinal: Negative for abdominal pain, blood in stool, constipation, diarrhea, nausea and vomiting.   Endocrine: Negative for cold intolerance and heat intolerance.   Genitourinary: Negative for decreased urine volume, flank pain, frequency, menstrual problem, urgency, vaginal bleeding, vaginal discharge and vaginal pain.   Musculoskeletal: Negative for arthralgias and neck stiffness.   Skin: Negative for color change.   Neurological: Negative for dizziness, weakness, light-headedness and headaches.   Psychiatric/Behavioral: Negative for agitation. The patient is not nervous/anxious.    All other systems  "reviewed and are negative.      Vitals:    02/06/20 1033   BP: 116/62   Pulse: 68   Temp: 96.3 °F (35.7 °C)   SpO2: 97%       Objective   /62 (BP Location: Right arm, Patient Position: Sitting, Cuff Size: Large Adult)   Pulse 68   Temp 96.3 °F (35.7 °C) (Temporal)   Ht 166.4 cm (65.5\")   Wt 101 kg (222 lb 12.8 oz)   LMP  (LMP Unknown)   SpO2 97%   Breastfeeding No   BMI 36.51 kg/m²     Physical Exam   Constitutional: She is oriented to person, place, and time. Vital signs are normal. She appears well-developed and well-nourished.   HENT:   Head: Normocephalic.   Right Ear: Hearing, tympanic membrane, external ear and ear canal normal.   Left Ear: Hearing, tympanic membrane, external ear and ear canal normal.   Nose: Nose normal.   Mouth/Throat: Uvula is midline, oropharynx is clear and moist and mucous membranes are normal.   Eyes: Pupils are equal, round, and reactive to light. Conjunctivae and lids are normal.   Neck: Normal range of motion. No JVD present. Carotid bruit is not present. No thyromegaly present.   Cardiovascular: Normal rate, regular rhythm, normal heart sounds and intact distal pulses.   Pulmonary/Chest: Effort normal and breath sounds normal. She has no wheezes.   Declined breast exam today.  Education provided.   Abdominal: Soft. Normal appearance, normal aorta and bowel sounds are normal. There is no hepatosplenomegaly. There is no tenderness. There is no CVA tenderness, no tenderness at McBurney's point and negative Villanueva's sign.   Genitourinary:   Genitourinary Comments: Declined Pap today.  Education provided.   Musculoskeletal: Normal range of motion.   Lymphadenopathy:        Head (right side): No submental, no submandibular, no tonsillar, no preauricular, no posterior auricular and no occipital adenopathy present.        Head (left side): No submental, no submandibular, no tonsillar, no preauricular, no posterior auricular and no occipital adenopathy present.     She has no " cervical adenopathy.   Neurological: She is alert and oriented to person, place, and time. GCS eye subscore is 4. GCS verbal subscore is 5. GCS motor subscore is 6.   Skin: Skin is warm, dry and intact.   Psychiatric: She has a normal mood and affect. Her speech is normal and behavior is normal. Judgment and thought content normal.   Nursing note and vitals reviewed.       Assessment/Plan   1. Healthy female exam.    2. Patient Counseling: Including but not Limited to the following, when appropriate:  --Nutrition: Stressed importance of moderation in sodium/caffeine intake, saturated fat and cholesterol, caloric balance, sufficient intake of fresh fruits, vegetables, fiber, calcium, iron, and 1 mg of folate supplement per day (for females capable of pregnancy).  --Discussed the issue of estrogen replacement, calcium supplement, and the daily use of baby aspirin.  --Exercise: Stressed the importance of regular exercise.   --Substance Abuse: Discussed cessation/primary prevention of tobacco, alcohol, or other drug use; driving or other dangerous activities under the influence; availability of treatment for abuse, as indicated based on social history.    --Sexuality: Discussed sexually transmitted diseases, partner selection, use of condoms, avoidance of unintended pregnancy  and contraceptive alternatives.   --Injury prevention: Discussed safety belts, safety helmets, smoke detector, smoking near bedding or upholstery.   --Dental health: Discussed importance of regular tooth brushing, flossing, and dental visits.  --Immunizations reviewed.  --Discussed benefits of colon cancer screening.      3. Discussed the patient's BMI with her.  The BMI is above average; BMI management plan is completed  4. Return in about 6 months (around 8/6/2020) for Follow-up for vitamin D def and smoking cessation, EDS.  5. Age-appropriate Screening Scheduled  6. There are no Patient Instructions on file for this visit.    Assessment/Plan      Francisca was seen today for annual exam.    Diagnoses and all orders for this visit:    Annual physical exam    Vitamin D deficiency  -     Vitamin D 25 Hydroxy; Future  Vitamin B12 deficiency  -     Vitamin B12; Future  Neutrophilia  -     CBC & Differential; Future    Cigarette smoker  -     buPROPion XL (WELLBUTRIN XL) 150 MG 24 hr tablet; Take 1 tablet by mouth Daily.  -Trialed Wellbutrin 75 mg twice daily over the past several months.  Felt that it helped with her smoking cessation.  Over the holidays she quit taking it and began smoking more.  We will trial Wellbutrin 150 mg.  Discussed that we could increase from there.    Mild asthma, unspecified whether complicated, unspecified whether persistent  -     albuterol (PROAIR RESPICLICK) 108 (90 Base) MCG/ACT inhaler; Inhale 2 puffs Every 4 (Four) Hours As Needed for Shortness of Air.    Obesity (BMI 35.0-39.9 without comorbidity)  Patient's Body mass index is 36.51 kg/m². BMI is above normal parameters. Recommendations include: educational material, exercise counseling and nutrition counseling.  -Had gastric bypass several years ago.  Plans to start exercising soon.    I will notify of lab results.  Education provided on Pap smear.  Encouraged Pap at next visit.  She verbalized understanding and will schedule.    -Refused vaccines.  Education provided.

## 2020-02-07 DIAGNOSIS — E55.9 VITAMIN D DEFICIENCY: Primary | ICD-10-CM

## 2020-02-07 RX ORDER — ERGOCALCIFEROL 1.25 MG/1
50000 CAPSULE ORAL WEEKLY
Qty: 12 CAPSULE | Refills: 1 | Status: SHIPPED | OUTPATIENT
Start: 2020-02-07 | End: 2022-09-16

## 2021-04-20 ENCOUNTER — OFFICE VISIT (OUTPATIENT)
Dept: FAMILY MEDICINE CLINIC | Facility: CLINIC | Age: 51
End: 2021-04-20

## 2021-04-20 VITALS
HEART RATE: 88 BPM | BODY MASS INDEX: 37.96 KG/M2 | OXYGEN SATURATION: 99 % | DIASTOLIC BLOOD PRESSURE: 68 MMHG | SYSTOLIC BLOOD PRESSURE: 110 MMHG | WEIGHT: 236.2 LBS | HEIGHT: 66 IN

## 2021-04-20 DIAGNOSIS — G43.009 MIGRAINE WITHOUT AURA AND WITHOUT STATUS MIGRAINOSUS, NOT INTRACTABLE: ICD-10-CM

## 2021-04-20 DIAGNOSIS — J45.909 MILD ASTHMA, UNSPECIFIED WHETHER COMPLICATED, UNSPECIFIED WHETHER PERSISTENT: Primary | ICD-10-CM

## 2021-04-20 DIAGNOSIS — E66.9 OBESITY (BMI 35.0-39.9 WITHOUT COMORBIDITY): ICD-10-CM

## 2021-04-20 DIAGNOSIS — F17.210 CIGARETTE SMOKER: ICD-10-CM

## 2021-04-20 PROBLEM — T78.3XXA ANGIOEDEMA OF LIPS: Status: RESOLVED | Noted: 2020-01-08 | Resolved: 2021-04-20

## 2021-04-20 PROCEDURE — 99214 OFFICE O/P EST MOD 30 MIN: CPT | Performed by: FAMILY MEDICINE

## 2021-04-20 RX ORDER — BUPROPION HYDROCHLORIDE 150 MG/1
150 TABLET ORAL DAILY
Qty: 30 TABLET | Refills: 3 | Status: SHIPPED | OUTPATIENT
Start: 2021-04-20 | End: 2022-09-09 | Stop reason: SDUPTHER

## 2021-04-20 RX ORDER — ALBUTEROL SULFATE 90 UG/1
2 POWDER, METERED RESPIRATORY (INHALATION) EVERY 4 HOURS PRN
Qty: 1 EACH | Refills: 3 | Status: SHIPPED | OUTPATIENT
Start: 2021-04-20 | End: 2021-04-21

## 2021-04-20 NOTE — PROGRESS NOTES
Chief Complaint  Headache (patient states that she has missed 4 days of work in the past month, would like to discuss [facility] paperwork), Night Sweats (believes that she is starting to go through menopause), and Nicotine Dependence (would like refill on wellbutrin)    Subjective     {Problem List  Visit Diagnosis   Encounters  Notes  Medications  Labs  Result Review Imaging  Media :23}     Francisca Adkins presents to [facility]  History of Present Illness   She quits smoking, but stress is a trigger. Longest time quit was 2.5 years.  Asthma in adult ochoa. Intermittent albuterol use, worse when she gets a cold. She has been unable to quit cold turkey    Francisca Adkins  reports that she has been smoking cigarettes. She started smoking about 31 years ago. She has a 30.00 pack-year smoking history. She has never used smokeless tobacco.. I have educated her on the risk of diseases from using tobacco products such as asthma.     I advised her to quit and she is willing to quit. We have discussed the following method/s for tobacco cessation:  Prescription Medicaiton.  Together we have set a quit date for now.  She will follow up with me in 3 months or sooner to check on her progress.    I spent 4 minutes counseling the patient.    She broke foot and was in bed for 3 months. Depression when fiance passed away. She drinks plenty water.  She snacks, but doesn't eat a whole meal. 7:30 and 11 am snacks. She has salad around 6:30 pm. She doesn't want to do bariatric surgery again. She eats mostly baked. Fried once a week. Green vegetables. Loves fruit. She doesn't do bread, sweets or candy.     She thinks she is going through menopause. Hot and cold flashes, night sweats, bad headaches. H/o migraines. She has missed 4-5 days of work for severe headaches. Sharp shooting, frontal headache, lightheaded. She treats with Tylenol. Headache last 4-5 hours. This morning had  "headache, now improved. She works as . She can't be completely alert when she has a headache. She has had a burn once. No period since age 32.         Objective   Vital Signs:   /68 (BP Location: Left arm, Patient Position: Sitting, Cuff Size: Adult)   Pulse 88   Ht 166.4 cm (65.5\")   Wt 107 kg (236 lb 3.2 oz)   SpO2 99%   BMI 38.71 kg/m²     Physical Exam   Result Review :                 Assessment and Plan    Diagnoses and all orders for this visit:    1. Mild asthma, unspecified whether complicated, unspecified whether persistent (Primary)  -     albuterol (ProAir RespiClick) 108 (90 Base) MCG/ACT inhaler; Inhale 2 puffs Every 4 (Four) Hours As Needed for Shortness of Air.  Dispense: 1 each; Refill: 3    2. Cigarette smoker  -     buPROPion XL (WELLBUTRIN XL) 150 MG 24 hr tablet; Take 1 tablet by mouth Daily.  Dispense: 30 tablet; Refill: 3  Bupropion for the next 2 to 3 months.  May also help with emotional eating for weight loss.  3. Obesity (BMI 35.0-39.9 without comorbidity)  Patient's (Body mass index is 38.71 kg/m².) indicates that they are obese (BMI >30) with obesity-related health conditions that include none . Obesity is newly identified. BMI is is above average; BMI management plan is completed. We discussed low calorie, low carb based diet program, consulting a Bariatric surgeon, pharmacologic options including bupropion and high protein diet.     4. Migraine without aura and without status migrainosus, not intractable  Requested HR department to fax FMLA forms.  She has a chronic illness needing intermittent leave for up to 4 to 5 hours per episode.  Severe headaches for migraine along with feeling lightheaded mid impacts her alertness while performing her job function as a .  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  She is unable to take NSAIDs due to gastric bypass.  Discussed other medications options including triptans and CGRP medications but patient " deferred.  Continue Tylenol.      Follow Up   Return in about 3 months (around 7/20/2021) for Physical and smoking cessation.  Patient was given instructions and counseling regarding her condition or for health maintenance advice. Please see specific information pulled into the AVS if appropriate.     Electronically signed by Marti Muller MD, 04/20/21, 11:00 AM EDT.

## 2021-04-21 ENCOUNTER — TELEPHONE (OUTPATIENT)
Dept: FAMILY MEDICINE CLINIC | Facility: CLINIC | Age: 51
End: 2021-04-21

## 2021-04-21 RX ORDER — ALBUTEROL SULFATE 90 UG/1
2 AEROSOL, METERED RESPIRATORY (INHALATION) EVERY 4 HOURS PRN
Qty: 18 G | Refills: 2 | Status: SHIPPED | OUTPATIENT
Start: 2021-04-21 | End: 2022-09-04 | Stop reason: SDUPTHER

## 2021-04-21 NOTE — TELEPHONE ENCOUNTER
Pharmacy Name: [facility] [address] AT [facility] - [phone] [facility] [phone]   [phone]    Pharmacy representative name: NICOLA        What medication are you calling in regards to: albuterol (ProAir RespiClick) 108 (90 Base) MCG/ACT inhaler    What question does the pharmacy have:     PHARMACY CALLED TO CONFIRM IF MESSAGE WAS RECEIVED FROM 4/20 REGARDING REFILL.  PATIENTS INSURANCE WILL NOT COVER.  INSURANCE WILL ONLY COVER REGULAR PUFF INHALER.

## 2021-04-21 NOTE — TELEPHONE ENCOUNTER
Insurance will not cover initial prescription. Is there an alternative you would  like her to have?

## 2021-04-22 ENCOUNTER — IMMUNIZATION (OUTPATIENT)
Dept: VACCINE CLINIC | Facility: HOSPITAL | Age: 51
End: 2021-04-22

## 2021-04-22 PROCEDURE — 0001A: CPT | Performed by: INTERNAL MEDICINE

## 2021-04-22 PROCEDURE — 91300 HC SARSCOV02 VAC 30MCG/0.3ML IM: CPT | Performed by: INTERNAL MEDICINE

## 2021-04-26 ENCOUNTER — TELEPHONE (OUTPATIENT)
Dept: FAMILY MEDICINE CLINIC | Facility: CLINIC | Age: 51
End: 2021-04-26

## 2021-04-26 NOTE — TELEPHONE ENCOUNTER
Caller: Francisca Adkins    Relationship: Self    Best call back number: [phone]     What form or medical record are you requesting: [facility] PAPERWORK  Who is requesting this form or medical record from you: THE PATIENT    How would you like to receive the form or medical records (pick-up, mail, fax): FAX (RE-FAXED)  If fax, what is the fax number: [phone]  Timeframe paperwork needed: ASAP    Additional notes: THE PATIENT STATES HER [facility] PAPERWORK SECTION PART C ON THE PROVIDER'S INFORMATION PAGE WAS INCOMPLETE.  SHE REPORTS THIS PAGE WAS SIGNED BUT BOXES WERE NOT CHECKED OFF ON INTERMITTENT LEAVE AND THE QUESTIONS THAT FOLLOW THAT BOX WERE NOT FILLED OUT BEFORE SIGNING OFF ON.      THE PATIENT IS REQUESTING THAT THIS PAPERWORK BE COMPLETED AND RE-FAXED.    IF ANY QUESTIONS, PLEASE CALL THE PATIENT -[phone].

## 2021-05-12 ENCOUNTER — IMMUNIZATION (OUTPATIENT)
Dept: VACCINE CLINIC | Facility: HOSPITAL | Age: 51
End: 2021-05-12

## 2021-05-12 PROCEDURE — 91300 HC SARSCOV02 VAC 30MCG/0.3ML IM: CPT | Performed by: INTERNAL MEDICINE

## 2021-05-12 PROCEDURE — 0002A: CPT | Performed by: INTERNAL MEDICINE

## 2021-06-11 ENCOUNTER — OFFICE VISIT (OUTPATIENT)
Dept: FAMILY MEDICINE CLINIC | Facility: CLINIC | Age: 51
End: 2021-06-11

## 2021-06-11 VITALS
OXYGEN SATURATION: 99 % | HEIGHT: 66 IN | BODY MASS INDEX: 37.12 KG/M2 | WEIGHT: 231 LBS | DIASTOLIC BLOOD PRESSURE: 72 MMHG | HEART RATE: 81 BPM | SYSTOLIC BLOOD PRESSURE: 118 MMHG | TEMPERATURE: 97.8 F

## 2021-06-11 DIAGNOSIS — E66.9 OBESITY (BMI 35.0-39.9 WITHOUT COMORBIDITY): ICD-10-CM

## 2021-06-11 DIAGNOSIS — Z12.4 SCREENING FOR CERVICAL CANCER: ICD-10-CM

## 2021-06-11 DIAGNOSIS — Z12.11 SCREENING FOR COLON CANCER: ICD-10-CM

## 2021-06-11 DIAGNOSIS — Z12.31 ENCOUNTER FOR SCREENING MAMMOGRAM FOR MALIGNANT NEOPLASM OF BREAST: ICD-10-CM

## 2021-06-11 DIAGNOSIS — Z00.00 ANNUAL PHYSICAL EXAM: Primary | ICD-10-CM

## 2021-06-11 DIAGNOSIS — F17.210 CIGARETTE SMOKER: ICD-10-CM

## 2021-06-11 DIAGNOSIS — Z11.59 ENCOUNTER FOR HEPATITIS C SCREENING TEST FOR LOW RISK PATIENT: ICD-10-CM

## 2021-06-11 PROCEDURE — 99396 PREV VISIT EST AGE 40-64: CPT | Performed by: NURSE PRACTITIONER

## 2021-06-11 NOTE — PROGRESS NOTES
Francisca Adkins presents today for a physical    Chief Complaint   Patient presents with   • Annual Exam     Pt is here for a physical w/pap.         HPI   Answers for HPI/ROS submitted by the patient on 6/11/2021  What is the primary reason for your visit?: Physical    Pt feels like she has been doing well.      She does have menopausal migraines for which she has been working with her PCP to treat.      She is taking Wellbutrin for smoking cessation.  She is down to 1/2 PPD.      She has not been exercising regularly currently d/t a work accident.  She has a broken foot and has not been released yet.  She has returned back to work.  She works at [employer].    She doesn't eat past 6:30pm.  She drinks 4-5 bottles of water per day.  She tries to watch what she eats.  She tries to avoid greasy/fried foods.      She has not had a colonoscopy.    She has not had a mammogram.      She had abnormal paps when she was younger.  They did all of the testing to see if she had cancer, but everything was fine.  She has been abstinent for 3 years.  No h/o STDs.      She does not use any illegal drugs.  She only drinks about 3 alcoholic beverages per week.    Patient Active Problem List   Diagnosis   • Cigarette smoker   • Obesity (BMI 35.0-39.9 without comorbidity)   • Migraine without aura and without status migrainosus, not intractable       Current Outpatient Medications   Medication Sig Dispense Refill   • albuterol sulfate  (90 Base) MCG/ACT inhaler Inhale 2 puffs Every 4 (Four) Hours As Needed for Wheezing or Shortness of Air. 18 g 2   • buPROPion XL (WELLBUTRIN XL) 150 MG 24 hr tablet Take 1 tablet by mouth Daily. 30 tablet 3   • cetirizine (zyrTEC) 10 MG tablet Take 1 tablet by mouth Daily.     • Multiple Vitamins-Minerals (HAIR SKIN AND NAILS FORMULA PO) Take 2 doses by mouth Daily.     • Multiple Vitamins-Minerals (MULTIVITAMIN WITH MINERALS) tablet tablet Take 1 tablet by mouth Daily.     •  "vitamin B-12 (CYANOCOBALAMIN) 1000 MCG tablet Take 1,000 mcg by mouth Daily.     • vitamin D (ERGOCALCIFEROL) 1.25 MG (67178 UT) capsule capsule Take 1 capsule by mouth 1 (One) Time Per Week. 12 capsule 1   • Calcium + Vitamin D3 600-10 MG-MCG tablet        No current facility-administered medications for this visit.       No Known Allergies     Past Medical History:   Diagnosis Date   • Angioedema of lips 1/8/2020   • Asthma    • Depression    • GERD (gastroesophageal reflux disease)    • H/O gastric bypass    • Headache    • Hives    • Migraine    • Ovarian cyst         Past Surgical History:   Procedure Laterality Date   • CHOLECYSTECTOMY     • ENDOMETRIAL ABLATION     • FOOT FRACTURE SURGERY Right 11/2020   • JOVANNA-EN-Y  2003    Highest weight 275 lbs   • TONSILLECTOMY     • TUBAL ABDOMINAL LIGATION          Family History   Problem Relation Age of Onset   • Arthritis Mother    • Diabetes Paternal Aunt    • Heart attack Maternal Grandmother    • Hypertension Maternal Grandmother    • Stroke Maternal Grandmother         Social History     Socioeconomic History   • Marital status:      Spouse name: Not on file   • Number of children: Not on file   • Years of education: Not on file   • Highest education level: Not on file   Tobacco Use   • Smoking status: Current Every Day Smoker     Packs/day: 1.00     Years: 30.00     Pack years: 30.00     Types: Cigarettes     Start date: 1990   • Smokeless tobacco: Never Used   Substance and Sexual Activity   • Alcohol use: Yes     Alcohol/week: 3.0 standard drinks     Types: 3 Shots of liquor per week   • Drug use: No        Vitals:    06/11/21 1241   BP: 118/72   Pulse: 81   Temp: 97.8 °F (36.6 °C)   SpO2: 99%   Weight: 105 kg (231 lb)   Height: 166.4 cm (65.5\")   PainSc: 0-No pain      Body mass index is 37.86 kg/m².    Patient's Body mass index is 37.86 kg/m². indicating that she is obese (BMI >30). Obesity-related health conditions include the following: none. " Obesity is unchanged. BMI is is above average; BMI management plan is completed. We discussed portion control and increasing exercise..      Physical Exam  Vitals reviewed. Exam conducted with a chaperone present (Shruthi Elizabeth MA).   Constitutional:       Appearance: Normal appearance. She is well-developed. She is obese.   HENT:      Head: Normocephalic and atraumatic.      Right Ear: Tympanic membrane, ear canal and external ear normal.      Left Ear: Tympanic membrane, ear canal and external ear normal.      Nose: Nose normal.      Mouth/Throat:      Mouth: Mucous membranes are moist.      Pharynx: Oropharynx is clear.   Eyes:      General: Lids are normal. Lids are everted, no foreign bodies appreciated.      Extraocular Movements: Extraocular movements intact.      Conjunctiva/sclera: Conjunctivae normal.      Pupils: Pupils are equal, round, and reactive to light.   Neck:      Thyroid: No thyroid mass or thyromegaly.      Vascular: No carotid bruit.      Trachea: Trachea normal.   Cardiovascular:      Rate and Rhythm: Normal rate and regular rhythm.      Pulses: Normal pulses.      Heart sounds: Normal heart sounds.   Pulmonary:      Effort: Pulmonary effort is normal.      Breath sounds: Normal breath sounds.   Abdominal:      General: Bowel sounds are normal.      Palpations: Abdomen is soft.      Tenderness: There is no abdominal tenderness. There is no guarding or rebound.      Hernia: There is no hernia in the left inguinal area or right inguinal area.   Genitourinary:     General: Normal vulva.      Exam position: Lithotomy position.      Ralph stage (genital): 5.      Vagina: Normal.      Cervix: Normal.      Uterus: Normal.       Adnexa: Right adnexa normal and left adnexa normal.      Rectum: Normal.   Musculoskeletal:         General: Normal range of motion.      Cervical back: Normal range of motion and neck supple.      Comments: Strength 5/5 to BUE and BLE.   Lymphadenopathy:      Lower Body:  No right inguinal adenopathy. No left inguinal adenopathy.   Skin:     General: Skin is warm and dry.   Neurological:      General: No focal deficit present.      Mental Status: She is alert and oriented to person, place, and time.      Deep Tendon Reflexes: Reflexes are normal and symmetric.      Comments: DTRs +2.   Psychiatric:         Mood and Affect: Mood normal.         Behavior: Behavior normal.         Thought Content: Thought content normal.         Judgment: Judgment normal.          Results for orders placed or performed in visit on 02/06/20   Vitamin D 25 Hydroxy    Specimen: Blood   Result Value Ref Range    25 Hydroxy, Vitamin D 21.6 (L) 30.0 - 100.0 ng/ml   Vitamin B12    Specimen: Blood   Result Value Ref Range    Vitamin B-12 743 211 - 946 pg/mL   CBC Auto Differential    Specimen: Blood   Result Value Ref Range    WBC 7.46 3.40 - 10.80 10*3/mm3    RBC 4.50 3.77 - 5.28 10*6/mm3    Hemoglobin 13.7 12.0 - 15.9 g/dL    Hematocrit 41.4 34.0 - 46.6 %    MCV 92.0 79.0 - 97.0 fL    MCH 30.4 26.6 - 33.0 pg    MCHC 33.1 31.5 - 35.7 g/dL    RDW 13.0 12.3 - 15.4 %    RDW-SD 42.7 37.0 - 54.0 fl    MPV 12.4 (H) 6.0 - 12.0 fL    Platelets 222 140 - 450 10*3/mm3    Neutrophil % 46.2 42.7 - 76.0 %    Lymphocyte % 39.1 19.6 - 45.3 %    Monocyte % 8.0 5.0 - 12.0 %    Eosinophil % 6.0 0.3 - 6.2 %    Basophil % 0.7 0.0 - 1.5 %    Immature Grans % 0.0 0.0 - 0.5 %    Neutrophils, Absolute 3.44 1.70 - 7.00 10*3/mm3    Lymphocytes, Absolute 2.92 0.70 - 3.10 10*3/mm3    Monocytes, Absolute 0.60 0.10 - 0.90 10*3/mm3    Eosinophils, Absolute 0.45 (H) 0.00 - 0.40 10*3/mm3    Basophils, Absolute 0.05 0.00 - 0.20 10*3/mm3    Immature Grans, Absolute 0.00 0.00 - 0.05 10*3/mm3    nRBC 0.0 0.0 - 0.2 /100 WBC        Immunization History   Administered Date(s) Administered   • COVID-19 (PFIZER) 04/22/2021, 05/12/2021       Health Maintenance   Topic Date Due   • MAMMOGRAM  Never done   • PAP SMEAR  Never done   • ZOSTER VACCINE (1 of  2) Never done   • INFLUENZA VACCINE  Discontinued   • TDAP/TD VACCINES  Discontinued       Diagnoses and all orders for this visit:    1. Annual physical exam (Primary) -exam unremarkable.    2. Encounter for screening mammogram for malignant neoplasm of breast -she does perform her own breast exams, but has not had a mammogram.  --Order placed for mammo.  -     Mammo Screening Digital Tomosynthesis Bilateral With CAD; Future    3. Screening for colon cancer -pt does not want a colonoscopy.  She does not have a family history of colon cancer.  --Referral placed for Cologuard.  -     Cologuard - Stool, Per Rectum; Future    4. Screening for cervical cancer -exam unremarkable.  Pap collected.  Will notify pt when results are received.  -     Liquid-based Pap Smear, Screening; Future    5. Obesity (BMI 35.0-39.9 without comorbidity) -pt does eat healthy, but has not been able to exercise d/t fracturing her foot at work.  She is encouraged to start a regimen when she is able.      6. Cigarette smoker -with using Wellbutrin, she has decreased her smoking to 1/2 PPD.  Congratulated pt on her success.  Continue to cut back as tolerated.  --Order placed for lung cancer screening.  Pt is agreeable to this.    Counseled on health maintenance topics and preventative care recommendations: Breast cancer screening, cervical cancer screening, colon cancer screening, fasting labs, healthy eating (avoid sweets and excessive intake of carbs), routine physical activity (150 minutes/week of moderate intensity exercise), monthly skin exams, sunscreen use, eye exams, dental exams.       Return in about 6 months (around 12/11/2021) for Follow-up.    Millie Van, JIMMIE

## 2021-06-13 RX ORDER — CALCIUM CARBONATE/VITAMIN D3 600 MG-10
TABLET ORAL
COMMUNITY
Start: 2021-05-24 | End: 2022-09-20

## 2021-06-17 DIAGNOSIS — Z12.4 SCREENING FOR CERVICAL CANCER: ICD-10-CM

## 2021-07-27 ENCOUNTER — HOSPITAL ENCOUNTER (OUTPATIENT)
Dept: MAMMOGRAPHY | Facility: HOSPITAL | Age: 51
Discharge: HOME | End: 2021-07-27
Admitting: NURSE PRACTITIONER

## 2021-07-27 DIAGNOSIS — Z12.31 ENCOUNTER FOR SCREENING MAMMOGRAM FOR MALIGNANT NEOPLASM OF BREAST: ICD-10-CM

## 2021-07-27 PROCEDURE — 77063 BREAST TOMOSYNTHESIS BI: CPT | Performed by: RADIOLOGY

## 2021-07-27 PROCEDURE — 77063 BREAST TOMOSYNTHESIS BI: CPT

## 2021-07-27 PROCEDURE — 77067 SCR MAMMO BI INCL CAD: CPT

## 2021-07-27 PROCEDURE — 77067 SCR MAMMO BI INCL CAD: CPT | Performed by: RADIOLOGY

## 2021-12-02 ENCOUNTER — TELEPHONE (OUTPATIENT)
Dept: FAMILY MEDICINE CLINIC | Facility: CLINIC | Age: 51
End: 2021-12-02

## 2021-12-02 NOTE — TELEPHONE ENCOUNTER
Patient would like her immunization record to be sent to [employer] to show proof to her employer    Letter has been sent.

## 2021-12-02 NOTE — TELEPHONE ENCOUNTER
Caller: Francisca Adkins    Relationship: Self    Best call back number: [phone]    What form or medical record are you requesting: COPY OF COVID VACCINE    Who is requesting this form or medical record from you: EMPLOYER    How would you like to receive the form or medical records (pick-up, mail, fax):     If fax, what is the fax number:     If mail, what is the address:     If pick-up, provide patient with address and location details    Timeframe paperwork needed: ASAP       Additional notes: WILL  TOMORROW AFTERWORK

## 2021-12-30 ENCOUNTER — OFFICE VISIT (OUTPATIENT)
Dept: FAMILY MEDICINE CLINIC | Facility: CLINIC | Age: 51
End: 2021-12-30

## 2021-12-30 VITALS
WEIGHT: 211 LBS | HEIGHT: 66 IN | BODY MASS INDEX: 33.91 KG/M2 | DIASTOLIC BLOOD PRESSURE: 84 MMHG | HEART RATE: 103 BPM | OXYGEN SATURATION: 98 % | SYSTOLIC BLOOD PRESSURE: 122 MMHG

## 2021-12-30 DIAGNOSIS — R21 RASH: Primary | ICD-10-CM

## 2021-12-30 PROCEDURE — 99213 OFFICE O/P EST LOW 20 MIN: CPT | Performed by: NURSE PRACTITIONER

## 2021-12-30 NOTE — PROGRESS NOTES
"Chief Complaint  Skin Discoloration (on forehead. Started a couple of months ago)    Subjective          Francisca Adkins presents to [facility]     History of Present Illness  It has started over the last couple of months.  Lately it has become more obvious.  Her children mentioned it to her.  She denies itching or flaking skin. She has not had issues with her skin before.  She denies family members with skin issues.      Objective   Vital Signs:   /84   Pulse 103   Ht 166.4 cm (65.51\")   Wt 95.7 kg (211 lb)   SpO2 98%   BMI 34.57 kg/m²       Physical Exam  Vitals reviewed.   Constitutional:       Appearance: Normal appearance.   HENT:      Head: Normocephalic and atraumatic.     Pulmonary:      Effort: Pulmonary effort is normal.   Skin:     General: Skin is warm and dry.   Neurological:      General: No focal deficit present.      Mental Status: She is alert and oriented to person, place, and time.   Psychiatric:         Mood and Affect: Mood normal.         Behavior: Behavior normal.         Thought Content: Thought content normal.         Judgment: Judgment normal.            Result Review :                 Assessment and Plan    Diagnoses and all orders for this visit:    1. Rash (Primary) -tinea versicolor vs. Vitiligo.  Appears to likely be vitiligo, but will treat as tinea versicolor to see if there is an improvement.  --Referral to dermatology.  --Start Ketoconazole shampoo.  If doesn't help with one use, repeat in 2-3 days.  Avoid contact with eyes.  -     Ketoconazole 1 % shampoo; Apply 1 dose topically 3 (Three) Times a Week.  Dispense: 200 mL; Refill: 0  -     Ambulatory Referral to Dermatology        Follow Up   Return if symptoms worsen or fail to improve.  Patient was given instructions and counseling regarding her condition or for health maintenance advice. Please see specific information pulled into the AVS if appropriate.       "

## 2022-09-06 RX ORDER — ALBUTEROL SULFATE 90 UG/1
2 AEROSOL, METERED RESPIRATORY (INHALATION) EVERY 4 HOURS PRN
Qty: 18 G | Refills: 2 | Status: SHIPPED | OUTPATIENT
Start: 2022-09-06

## 2022-09-06 NOTE — TELEPHONE ENCOUNTER
Rx Refill Note  Requested Prescriptions     Pending Prescriptions Disp Refills   • albuterol sulfate  (90 Base) MCG/ACT inhaler 18 g 2     Sig: Inhale 2 puffs Every 4 (Four) Hours As Needed for Wheezing or Shortness of Air.      Last office visit with prescribing clinician: 12/30/2021     Next office visit with prescribing clinician: Visit date not found            Thalia Qureshi MA  09/06/22, 13:25 EDT

## 2022-09-09 ENCOUNTER — OFFICE VISIT (OUTPATIENT)
Dept: FAMILY MEDICINE CLINIC | Facility: CLINIC | Age: 52
End: 2022-09-09

## 2022-09-09 VITALS
SYSTOLIC BLOOD PRESSURE: 112 MMHG | BODY MASS INDEX: 31.95 KG/M2 | HEART RATE: 78 BPM | DIASTOLIC BLOOD PRESSURE: 74 MMHG | WEIGHT: 198.8 LBS | HEIGHT: 66 IN | OXYGEN SATURATION: 95 %

## 2022-09-09 DIAGNOSIS — F17.210 CIGARETTE SMOKER: ICD-10-CM

## 2022-09-09 DIAGNOSIS — R91.8 LUNG MASS: Primary | ICD-10-CM

## 2022-09-09 PROCEDURE — 99214 OFFICE O/P EST MOD 30 MIN: CPT | Performed by: FAMILY MEDICINE

## 2022-09-09 RX ORDER — PROMETHAZINE HYDROCHLORIDE AND CODEINE PHOSPHATE 6.25; 1 MG/5ML; MG/5ML
5 SOLUTION ORAL EVERY 4 HOURS
COMMUNITY
Start: 2022-09-08 | End: 2022-09-15

## 2022-09-09 RX ORDER — BUPROPION HYDROCHLORIDE 150 MG/1
150 TABLET ORAL DAILY
Qty: 30 TABLET | Refills: 11 | Status: SHIPPED | OUTPATIENT
Start: 2022-09-09 | End: 2022-09-15

## 2022-09-09 RX ORDER — PREDNISONE 50 MG/1
1 TABLET ORAL DAILY
COMMUNITY
Start: 2022-09-08 | End: 2022-09-15

## 2022-09-09 NOTE — PROGRESS NOTES
"Chief Complaint  Asthma (Had Covid past 14 days was still having symptoms had Chest x-ray done they found 2cm module on right side of lung)    Subjective        Francisca Adkins presents to [facility]  Asthma  Her past medical history is significant for asthma.   Answers for HPI/ROS submitted by the patient on 9/9/2022  Please describe your symptoms.: Recommended by er to schedule cat scan  Have you had these symptoms before?: No  How long have you been having these symptoms?: 1-4 days  What is the primary reason for your visit?: Other    . Two Sundays ago home test for COVID was positive. She continued coughing. Then breathing was worse. Went to ED 9/7/22. Chest xray done. Treated with steroids and cough medication. Coughing is doing better. Hospital called her with 2cm mass in her right lung.     She is trying to quit smoking 2 pack in the past 2 weeks. She was smoking 1ppd.     She had 5 bones broken in her foot with workman's comp.         Objective   Vital Signs:  /74   Pulse 78   Ht 166.4 cm (65.51\")   Wt 90.2 kg (198 lb 12.8 oz)   SpO2 95%   BMI 32.57 kg/m²   Estimated body mass index is 32.57 kg/m² as calculated from the following:    Height as of this encounter: 166.4 cm (65.51\").    Weight as of this encounter: 90.2 kg (198 lb 12.8 oz).          Physical Exam  Vitals reviewed.   Constitutional:       General: She is not in acute distress.     Appearance: She is not ill-appearing.   Cardiovascular:      Rate and Rhythm: Normal rate and regular rhythm.   Pulmonary:      Effort: Pulmonary effort is normal.      Breath sounds: Normal breath sounds.   Neurological:      Mental Status: She is alert.        Result Review :                Assessment and Plan   Diagnoses and all orders for this visit:    1. Lung mass (Primary)  -     CT Chest With Contrast Diagnostic; Future  -     Ambulatory Referral to Pulmonology  New.  Stat orders.  Advanced imaging ordered " which she prefers to have done at [facility].  Referral placed to pulmonologist.  2. Cigarette smoker  -     buPROPion XL (WELLBUTRIN XL) 150 MG 24 hr tablet; Take 1 tablet by mouth Daily.  Dispense: 30 tablet; Refill: 11  Refill Wellbutrin.  Advised patient Chantix is no longer available.  Also discussed resources of quit NOW [place].  Francisca Adkins  reports that she has been smoking cigarettes. She started smoking about 32 years ago. She has a 30.00 pack-year smoking history. She has never used smokeless tobacco.. I have educated her on the risk of diseases from using tobacco products such as cancer.     I advised her to quit and she is willing to quit. We have discussed the following method/s for tobacco cessation:  Prescription Medicaiton.  Together we have set a quit date for ASAP.  She will follow up with me in PRN days or sooner to check on her progress.    I spent 3  minutes counseling the patient.                  Follow Up   Return in about 1 week (around 9/16/2022) for as scheduled.  Patient was given instructions and counseling regarding her condition or for health maintenance advice. Please see specific information pulled into the AVS if appropriate.     Electronically signed by Marti Muller MD, 09/09/22, 4:41 PM EDT.

## 2022-09-15 ENCOUNTER — OFFICE VISIT (OUTPATIENT)
Dept: PULMONOLOGY | Facility: CLINIC | Age: 52
End: 2022-09-15

## 2022-09-15 ENCOUNTER — TELEPHONE (OUTPATIENT)
Dept: FAMILY MEDICINE CLINIC | Facility: CLINIC | Age: 52
End: 2022-09-15

## 2022-09-15 VITALS
SYSTOLIC BLOOD PRESSURE: 118 MMHG | TEMPERATURE: 97.5 F | HEART RATE: 80 BPM | HEIGHT: 66 IN | WEIGHT: 202 LBS | DIASTOLIC BLOOD PRESSURE: 70 MMHG | BODY MASS INDEX: 32.47 KG/M2 | OXYGEN SATURATION: 97 %

## 2022-09-15 DIAGNOSIS — R91.1 LUNG NODULE: Primary | ICD-10-CM

## 2022-09-15 RX ORDER — BUPROPION HYDROCHLORIDE 100 MG/1
100 TABLET, EXTENDED RELEASE ORAL 2 TIMES DAILY
Qty: 60 TABLET | Refills: 2 | Status: SHIPPED | OUTPATIENT
Start: 2022-09-15

## 2022-09-15 NOTE — TELEPHONE ENCOUNTER
I sent a prescription for Wellbutrin 100 mg to take twice a day to the pharmacy.  Please keep appointment tomorrow for a physical.

## 2022-09-15 NOTE — TELEPHONE ENCOUNTER
Caller: GreenFrancisca    Relationship: Self    Best call back number: [phone]    What medications are you currently taking:   Current Outpatient Medications on File Prior to Visit   Medication Sig Dispense Refill   • albuterol sulfate  (90 Base) MCG/ACT inhaler Inhale 2 puffs Every 4 (Four) Hours As Needed for Wheezing or Shortness of Air. 18 g 2   • buPROPion XL (WELLBUTRIN XL) 150 MG 24 hr tablet Take 1 tablet by mouth Daily. 30 tablet 11   • Calcium + Vitamin D3 600-10 MG-MCG tablet      • cetirizine (zyrTEC) 10 MG tablet Take 1 tablet by mouth Daily.     • vitamin B-12 (CYANOCOBALAMIN) 1000 MCG tablet Take 1,000 mcg by mouth Daily.     • vitamin D (ERGOCALCIFEROL) 1.25 MG (32685 UT) capsule capsule Take 1 capsule by mouth 1 (One) Time Per Week. 12 capsule 1   • [DISCONTINUED] Ketoconazole 1 % shampoo Apply 1 dose topically 3 (Three) Times a Week. 200 mL 0   • [DISCONTINUED] predniSONE (DELTASONE) 50 MG tablet Take 1 tablet by mouth Daily. 5 days     • [DISCONTINUED] promethazine-codeine (PHENERGAN with CODEINE) 6.25-10 MG/5ML solution Take 5 mL by mouth Every 4 (Four) Hours.       No current facility-administered medications on file prior to visit.      Which medication are you concerned about: PT STATED THAT RX WELLBUTRIN IS TOO STRONG AND REQUEST TO HAVE STRENGTH DECREASED

## 2022-09-16 ENCOUNTER — OFFICE VISIT (OUTPATIENT)
Dept: FAMILY MEDICINE CLINIC | Facility: CLINIC | Age: 52
End: 2022-09-16

## 2022-09-16 VITALS
SYSTOLIC BLOOD PRESSURE: 120 MMHG | OXYGEN SATURATION: 95 % | WEIGHT: 201 LBS | BODY MASS INDEX: 32.3 KG/M2 | HEIGHT: 66 IN | DIASTOLIC BLOOD PRESSURE: 70 MMHG | HEART RATE: 95 BPM

## 2022-09-16 DIAGNOSIS — IMO0001 LUNG NODULE < 6CM ON CT: ICD-10-CM

## 2022-09-16 DIAGNOSIS — Z00.00 WELL ADULT EXAM: Primary | ICD-10-CM

## 2022-09-16 DIAGNOSIS — Z12.31 VISIT FOR SCREENING MAMMOGRAM: ICD-10-CM

## 2022-09-16 DIAGNOSIS — F17.210 CIGARETTE SMOKER: ICD-10-CM

## 2022-09-16 DIAGNOSIS — Z23 NEED FOR VACCINATION: ICD-10-CM

## 2022-09-16 PROBLEM — Z28.21 INFLUENZA VACCINATION DECLINED: Status: ACTIVE | Noted: 2022-09-16

## 2022-09-16 PROCEDURE — 99396 PREV VISIT EST AGE 40-64: CPT | Performed by: FAMILY MEDICINE

## 2022-09-16 RX ORDER — MULTIPLE VITAMINS W/ MINERALS TAB 9MG-400MCG
1 TAB ORAL DAILY
COMMUNITY

## 2022-09-16 NOTE — PROGRESS NOTES
"Chief Complaint  Well adult exam    Subjective          Francisca Adkins presents to [facility] for   History of Present Illness     She usually doesn't eat after 640 pm. Lots of green vegetables and fruit. Baked or broiled meat. Not fried foods. Lots of salads. She drinks 2 large bottles of electrolyte water at work. Pepsi or coffee if she has caffeine. She walks a lot and used to be at 10,000 steps per day.     Last cigarette on way to appointment because she was nervous and she smokes more. She went back to 1ppd, plans on cutting back down.     Breathing is getting better. She had COVID 8/21/22. Not coughing anymore.       Objective   Vital Signs:   Vitals:    09/16/22 1501   BP: 120/70   Pulse: 95   SpO2: 95%   Weight: 91.2 kg (201 lb)   Height: 166.4 cm (65.51\")     Body mass index is 32.93 kg/m².    BMI is >= 30 and <35. (Class 1 Obesity). The following options were offered after discussion;: exercise counseling/recommendations and nutrition counseling/recommendations          Physical Exam  Vitals reviewed.   Constitutional:       General: She is not in acute distress.     Appearance: She is not ill-appearing.   HENT:      Right Ear: Tympanic membrane and ear canal normal.      Left Ear: Tympanic membrane and ear canal normal.   Eyes:      General:         Right eye: No discharge.         Left eye: No discharge.      Conjunctiva/sclera: Conjunctivae normal.   Neck:      Thyroid: No thyromegaly.   Cardiovascular:      Rate and Rhythm: Normal rate and regular rhythm.   Pulmonary:      Effort: Pulmonary effort is normal. No respiratory distress.      Breath sounds: Normal breath sounds.   Chest:   Breasts:      Right: Normal. No mass, nipple discharge, skin change, tenderness, axillary adenopathy or supraclavicular adenopathy.      Left: Normal. No mass, nipple discharge, skin change, tenderness, axillary adenopathy or supraclavicular adenopathy.       Abdominal:      " Palpations: Abdomen is soft. There is no hepatomegaly.      Tenderness: There is no abdominal tenderness.   Genitourinary:     General: Normal vulva.      Exam position: Lithotomy position.      Pubic Area: No rash.       Labia:         Right: No lesion.         Left: No lesion.       Urethra: No urethral lesion.      Vagina: Normal.      Cervix: Normal.      Uterus: Normal.       Adnexa:         Right: No mass or tenderness.          Left: No mass or tenderness.        Rectum: No external hemorrhoid.      Comments: Normal external genitalia    Musculoskeletal:      Cervical back: Neck supple.      Right lower leg: No edema.      Left lower leg: No edema.   Lymphadenopathy:      Head:      Right side of head: No submandibular, preauricular or posterior auricular adenopathy.      Left side of head: No submandibular, preauricular or posterior auricular adenopathy.      Cervical: No cervical adenopathy.      Right cervical: No superficial cervical adenopathy.     Left cervical: No superficial cervical adenopathy.      Upper Body:      Right upper body: No supraclavicular or axillary adenopathy.      Left upper body: No supraclavicular or axillary adenopathy.   Skin:     General: Skin is warm.      Findings: No rash.   Neurological:      Mental Status: She is alert and oriented to person, place, and time.      Gait: Gait normal.   Psychiatric:         Mood and Affect: Mood normal.         Behavior: Behavior normal.         Thought Content: Thought content normal.         Judgment: Judgment normal.        Result Review :   The following data was reviewed by: Marti Muller MD on 09/16/2022:         CT chest 9/15/2022: Lung nodules 5 mm and less      Immunization History   Administered Date(s) Administered   • COVID-19 (PFIZER) PURPLE CAP 04/22/2021, 05/12/2021, 12/01/2021       Health Maintenance   Topic Date Due   • ZOSTER VACCINE (1 of 2) Never done   • MAMMOGRAM  07/27/2022   • PAP SMEAR  06/11/2026   •  INFLUENZA VACCINE  Discontinued   • TDAP/TD VACCINES  Discontinued            Assessment and Plan    Diagnoses and all orders for this visit:    1. Well adult exam (Primary)  -     CBC (No Diff); Future  -     Comprehensive Metabolic Panel; Future  -     TSH Rfx On Abnormal To Free T4; Future  -     Lipid Panel; Future  Cervical cancer screening up-to-date.  Colon cancer screening up-to-date.  Return when fasting to check labs.  Waiting on outside bone density test results to review.  2. Visit for screening mammogram  -     Mammo Screening Digital Tomosynthesis Bilateral With CAD; Future  Overdue for breast cancer screening.  3. Cigarette smoker  Francisca Adkins  reports that she has been smoking cigarettes. She started smoking about 32 years ago. She has a 15.00 pack-year smoking history. She has never used smokeless tobacco.. I have educated her on the risk of diseases from using tobacco products such as cancer.     I advised her to quit and she is willing to quit. We have discussed the following method/s for tobacco cessation:  Prescription Medicaiton.  Together we have set a quit date for ASAP.  She will follow up with me in 1 year or sooner to check on her progress.    I spent 3  minutes counseling the patient.    4. Need for vaccination  She declines tetanus, pneumonia, influenza vaccine  5. Lung nodule < 6cm on CT  Reviewed with patient CT scan showing several small lung nodules.  She has a an appointment with pulmonology.  Discussed the need to have annual CT scans.      Counseling/anticipatory guidance: Nutrition, physical activity,  immunizations, screenings      Follow Up   Return in about 1 year (around 9/16/2023) for Physical and fasting labs.  Patient was given instructions and counseling regarding her condition or for health maintenance advice. Please see specific information pulled into the AVS if appropriate.      Electronically signed by Marti Muller MD, 09/16/22, 3:33 PM  EDT.

## 2022-09-20 ENCOUNTER — OFFICE VISIT (OUTPATIENT)
Dept: PULMONOLOGY | Facility: CLINIC | Age: 52
End: 2022-09-20

## 2022-09-20 VITALS
HEIGHT: 66 IN | HEART RATE: 93 BPM | SYSTOLIC BLOOD PRESSURE: 118 MMHG | BODY MASS INDEX: 32.53 KG/M2 | DIASTOLIC BLOOD PRESSURE: 70 MMHG | WEIGHT: 202.4 LBS | OXYGEN SATURATION: 97 % | TEMPERATURE: 98.4 F

## 2022-09-20 DIAGNOSIS — R91.8 MULTIPLE PULMONARY NODULES: Primary | ICD-10-CM

## 2022-09-20 DIAGNOSIS — F17.210 CIGARETTE SMOKER: ICD-10-CM

## 2022-09-20 DIAGNOSIS — J43.2 CENTRILOBULAR EMPHYSEMA: ICD-10-CM

## 2022-09-20 DIAGNOSIS — Z00.6 EXAMINATION FOR NORMAL COMPARISON OR CONTROL IN CLINICAL RESEARCH: Primary | ICD-10-CM

## 2022-09-20 PROCEDURE — 99204 OFFICE O/P NEW MOD 45 MIN: CPT | Performed by: INTERNAL MEDICINE

## 2022-09-20 NOTE — PROGRESS NOTES
New Patient Pulmonary Office Visit      Patient Name: Francisca Adkins    Referring Physician: No ref. provider found    Chief Complaint:    Chief Complaint   Patient presents with   • Results     Discuss results with doctor       History of Present Illness: Francisca Adkins is a 51 y.o. female who is here today to establish care with Pulmonary.  Patient is a past medical history significant for tobacco abuse.  Referred to pulmonary for evaluation of multiple pulmonary nodules.  Patient notes that she had had some shortness of breath related to COVID-19 has started using albuterol more frequently.  Underwent a CT scan of the chest for lung cancer screening.  With there was multiple small nodules found.  She denies any chest pain, nausea, fever, or chills.  She denies any weight loss or hemoptysis.  She is still smoking but trying to quit.  She has a greater than 20-pack-year smoking history, but is trying to get down to a half a pack a day at this point.  Previously has been over a pack a day.  No other acute complaints.    Review of Systems:   Review of Systems   Constitutional: Negative for activity change, appetite change, chills, diaphoresis, fatigue and fever.   HENT: Negative for congestion, postnasal drip, sinus pressure and voice change.    Eyes: Negative for blurred vision.   Respiratory: Negative for cough, shortness of breath and wheezing.    Cardiovascular: Negative for chest pain.   Gastrointestinal: Negative for abdominal pain.   Musculoskeletal: Negative for myalgias.   Skin: Negative for color change and dry skin.   Allergic/Immunologic: Negative for environmental allergies.   Neurological: Negative for weakness and confusion.   Hematological: Negative for adenopathy.   Psychiatric/Behavioral: Negative for agitation, sleep disturbance and depressed mood.       Past Medical History:   Past Medical History:   Diagnosis Date   • Angioedema of lips 01/08/2020   • Asthma    • COVID-19  08/21/2022   • Depression    • GERD (gastroesophageal reflux disease)    • H/O gastric bypass    • Headache    • Hives    • Lung nodule < 6cm on CT 09/15/2022   • Migraine    • Ovarian cyst        Past Surgical History:   Past Surgical History:   Procedure Laterality Date   • BARIATRIC SURGERY  May 10,2003   • CHOLECYSTECTOMY     • ENDOMETRIAL ABLATION     • FOOT FRACTURE SURGERY Right 11/2020   • JOVANNA-EN-Y  2003    Highest weight 275 lbs   • TONSILLECTOMY     • TUBAL ABDOMINAL LIGATION         Family History:   Family History   Problem Relation Age of Onset   • Arthritis Mother    • Diabetes Paternal Aunt    • Heart attack Maternal Grandmother    • Hypertension Maternal Grandmother    • Stroke Maternal Grandmother    • Arthritis Maternal Grandmother    • Diabetes Maternal Uncle    • Breast cancer Neg Hx        Social History:   Social History     Socioeconomic History   • Marital status:    Tobacco Use   • Smoking status: Current Some Day Smoker     Packs/day: 0.50     Years: 30.00     Pack years: 15.00     Types: Cigarettes     Start date: 1/1/1990   • Smokeless tobacco: Never Used   Vaping Use   • Vaping Use: Never used   Substance and Sexual Activity   • Alcohol use: Yes     Alcohol/week: 3.0 standard drinks     Types: 3 Shots of liquor per week   • Drug use: No   • Sexual activity: Not Currently     Partners: Male     Birth control/protection: Abstinence       Medications:     Current Outpatient Medications:   •  albuterol sulfate  (90 Base) MCG/ACT inhaler, Inhale 2 puffs Every 4 (Four) Hours As Needed for Wheezing or Shortness of Air., Disp: 18 g, Rfl: 2  •  buPROPion SR (Wellbutrin SR) 100 MG 12 hr tablet, Take 1 tablet by mouth 2 (Two) Times a Day., Disp: 60 tablet, Rfl: 2  •  multivitamin with minerals tablet tablet, Take 1 tablet by mouth Daily., Disp: , Rfl:   •  vitamin B-12 (CYANOCOBALAMIN) 1000 MCG tablet, Take 1,000 mcg by mouth Daily., Disp: , Rfl:     Allergies:   No Known  "Allergies    Physical Exam:  Vital Signs:   Vitals:    09/20/22 1604   BP: 118/70   BP Location: Right arm   Patient Position: Sitting   Cuff Size: Adult   Pulse: 93   Temp: 98.4 °F (36.9 °C)   TempSrc: Temporal   SpO2: 97%   Weight: 91.8 kg (202 lb 6.4 oz)   Height: 166.4 cm (65.5\")       Physical Exam  Vitals and nursing note reviewed.   Constitutional:       General: She is not in acute distress.     Appearance: She is well-developed and normal weight. She is not ill-appearing or toxic-appearing.   HENT:      Head: Normocephalic and atraumatic.   Cardiovascular:      Rate and Rhythm: Normal rate and regular rhythm.      Pulses: Normal pulses.      Heart sounds: Normal heart sounds. No murmur heard.    No friction rub. No gallop.   Pulmonary:      Effort: Pulmonary effort is normal. No respiratory distress.      Breath sounds: Normal breath sounds. No wheezing, rhonchi or rales.   Musculoskeletal:      Right lower leg: No edema.      Left lower leg: No edema.   Skin:     General: Skin is warm and dry.   Neurological:      Mental Status: She is alert and oriented to person, place, and time.         Immunization History   Administered Date(s) Administered   • COVID-19 (PFIZER) PURPLE CAP 04/22/2021, 05/12/2021, 12/01/2021       Results Review:   - I personally reviewed the pts imaging from CT scan of the chest from September 2022 showed multiple pulmonary nodules 5 mm nodule noted in the left upper lobe, 4 mm nodule in the right upper lobe, and a 4 mm nodule in the right middle lobe.  - I personally reviewed the pts chart with regards to evaluation by her PCP.    Assessment / Plan:   1. 5 mm left upper lobe nodule, 4 mm right upper lobe nodule, and 4 mm right middle lobe nodule (9/2022) (Primary)  -Patient with multiple small nodules.  But at this time no need for increased CT imaging.  Given the sizes we would recommend that she have a repeat CT scan of the chest in 1 year.  She verbalized understanding of this.  I " have gone ahead and ordered a CT scan of the chest.    2. Paraseptal emphysema  -Patient has gold class A COPD  -Continue butyryl for medication management  - PFTs ordered for next visit  -Patient counseled on monitoring for COPD exacerbation and treatment plan  -Recommend 30 minutes of cardiovascular exercise per day    3. Cigarette smoker  -Discussed tobacco cessation on today's visit.  Highly recommend quitting smoking.  We discussed options that she may have in the future.  Also working on finding her triggers.      Follow Up:   Return in about 1 year (around 9/20/2023) for CT Chest with next visit, PFTs.     CAMRELA Mark, DO  Pulmonary and Critical Care Medicine  Note Electronically Signed    Part of this note may be an electronic transcription/translation of spoken language to printed text using the Dragon Dictation System.

## 2022-09-23 ENCOUNTER — TELEPHONE (OUTPATIENT)
Dept: FAMILY MEDICINE CLINIC | Facility: CLINIC | Age: 52
End: 2022-09-23

## 2022-09-23 NOTE — TELEPHONE ENCOUNTER
Attempted to contact patient, no answer. Left voicemail for patient to call the office back (office # given).     Hub may relay message & document.     Labs were ordered on 9/16 and are still active. She can have them collected wherever she would like.

## 2022-09-23 NOTE — TELEPHONE ENCOUNTER
Caller: Francisca Adkins    Relationship: Self  Best call back number:  [phone]    What orders are you requesting (i.e. lab or imaging):  BLOOD WORK      In what timeframe would the patient need to come in:  22    Additional notes:  PATIENT SAID SHE LOOKED IN HER MY CHART AND IT SAID THE BLOOD WORK ORDER HAS    AND SHE WANTS TO GO TO THE HOSPITAL ON  TO HAVE THIS DONE

## 2022-09-25 ENCOUNTER — LAB (OUTPATIENT)
Dept: LAB | Facility: HOSPITAL | Age: 52
End: 2022-09-25

## 2022-09-25 DIAGNOSIS — Z00.00 WELL ADULT EXAM: ICD-10-CM

## 2022-09-25 LAB
ALBUMIN SERPL-MCNC: 4.1 G/DL (ref 3.5–5.2)
ALBUMIN/GLOB SERPL: 1.5 G/DL
ALP SERPL-CCNC: 129 U/L (ref 39–117)
ALT SERPL W P-5'-P-CCNC: 24 U/L (ref 1–33)
ANION GAP SERPL CALCULATED.3IONS-SCNC: 6.2 MMOL/L (ref 5–15)
AST SERPL-CCNC: 23 U/L (ref 1–32)
BILIRUB SERPL-MCNC: 0.4 MG/DL (ref 0–1.2)
BUN SERPL-MCNC: 11 MG/DL (ref 6–20)
BUN/CREAT SERPL: 13.8 (ref 7–25)
CALCIUM SPEC-SCNC: 9.6 MG/DL (ref 8.6–10.5)
CHLORIDE SERPL-SCNC: 104 MMOL/L (ref 98–107)
CHOLEST SERPL-MCNC: 141 MG/DL (ref 0–200)
CO2 SERPL-SCNC: 27.8 MMOL/L (ref 22–29)
CREAT SERPL-MCNC: 0.8 MG/DL (ref 0.57–1)
DEPRECATED RDW RBC AUTO: 40.4 FL (ref 37–54)
EGFRCR SERPLBLD CKD-EPI 2021: 88.8 ML/MIN/1.73
ERYTHROCYTE [DISTWIDTH] IN BLOOD BY AUTOMATED COUNT: 12.2 % (ref 12.3–15.4)
GLOBULIN UR ELPH-MCNC: 2.8 GM/DL
GLUCOSE SERPL-MCNC: 96 MG/DL (ref 65–99)
HCT VFR BLD AUTO: 44.4 % (ref 34–46.6)
HDLC SERPL-MCNC: 47 MG/DL (ref 40–60)
HGB BLD-MCNC: 15.2 G/DL (ref 12–15.9)
LDLC SERPL CALC-MCNC: 79 MG/DL (ref 0–100)
LDLC/HDLC SERPL: 1.68 {RATIO}
MCH RBC QN AUTO: 31.6 PG (ref 26.6–33)
MCHC RBC AUTO-ENTMCNC: 34.2 G/DL (ref 31.5–35.7)
MCV RBC AUTO: 92.3 FL (ref 79–97)
PLATELET # BLD AUTO: 228 10*3/MM3 (ref 140–450)
PMV BLD AUTO: 12.4 FL (ref 6–12)
POTASSIUM SERPL-SCNC: 4.8 MMOL/L (ref 3.5–5.2)
PROT SERPL-MCNC: 6.9 G/DL (ref 6–8.5)
RBC # BLD AUTO: 4.81 10*6/MM3 (ref 3.77–5.28)
SODIUM SERPL-SCNC: 138 MMOL/L (ref 136–145)
TRIGL SERPL-MCNC: 75 MG/DL (ref 0–150)
TSH SERPL DL<=0.05 MIU/L-ACNC: 0.92 UIU/ML (ref 0.27–4.2)
VLDLC SERPL-MCNC: 15 MG/DL (ref 5–40)
WBC NRBC COR # BLD: 7.32 10*3/MM3 (ref 3.4–10.8)

## 2022-09-25 PROCEDURE — 80050 GENERAL HEALTH PANEL: CPT

## 2022-09-25 PROCEDURE — 80061 LIPID PANEL: CPT

## 2022-10-13 ENCOUNTER — HOSPITAL ENCOUNTER (OUTPATIENT)
Dept: MAMMOGRAPHY | Facility: HOSPITAL | Age: 52
Discharge: HOME | End: 2022-10-13
Admitting: FAMILY MEDICINE

## 2022-10-13 DIAGNOSIS — Z12.31 VISIT FOR SCREENING MAMMOGRAM: ICD-10-CM

## 2022-10-13 PROCEDURE — 77067 SCR MAMMO BI INCL CAD: CPT | Performed by: RADIOLOGY

## 2022-10-13 PROCEDURE — 77063 BREAST TOMOSYNTHESIS BI: CPT | Performed by: RADIOLOGY

## 2022-10-13 PROCEDURE — 77063 BREAST TOMOSYNTHESIS BI: CPT

## 2022-10-13 PROCEDURE — 77067 SCR MAMMO BI INCL CAD: CPT

## 2022-11-26 ENCOUNTER — PATIENT MESSAGE (OUTPATIENT)
Dept: FAMILY MEDICINE CLINIC | Facility: CLINIC | Age: 52
End: 2022-11-26

## 2023-02-02 ENCOUNTER — OFFICE VISIT (OUTPATIENT)
Dept: FAMILY MEDICINE CLINIC | Facility: CLINIC | Age: 53
End: 2023-02-02
Payer: COMMERCIAL

## 2023-02-02 ENCOUNTER — HOSPITAL ENCOUNTER (OUTPATIENT)
Dept: GENERAL RADIOLOGY | Facility: HOSPITAL | Age: 53
Discharge: HOME | End: 2023-02-02
Admitting: NURSE PRACTITIONER
Payer: COMMERCIAL

## 2023-02-02 VITALS
OXYGEN SATURATION: 97 % | SYSTOLIC BLOOD PRESSURE: 122 MMHG | WEIGHT: 217.4 LBS | HEART RATE: 65 BPM | DIASTOLIC BLOOD PRESSURE: 80 MMHG | HEIGHT: 66 IN | BODY MASS INDEX: 34.94 KG/M2

## 2023-02-02 DIAGNOSIS — M53.3 COCCYX PAIN: Primary | ICD-10-CM

## 2023-02-02 DIAGNOSIS — T30.0 BURN: ICD-10-CM

## 2023-02-02 PROCEDURE — 99213 OFFICE O/P EST LOW 20 MIN: CPT | Performed by: NURSE PRACTITIONER

## 2023-02-02 PROCEDURE — 72220 X-RAY EXAM SACRUM TAILBONE: CPT

## 2023-02-02 RX ORDER — CEPHALEXIN 500 MG/1
500 CAPSULE ORAL 2 TIMES DAILY
Qty: 10 CAPSULE | Refills: 0 | Status: SHIPPED | OUTPATIENT
Start: 2023-02-02 | End: 2023-02-07

## 2023-02-02 RX ORDER — METHOCARBAMOL 500 MG/1
500 TABLET, FILM COATED ORAL 4 TIMES DAILY
Qty: 30 TABLET | Refills: 0 | Status: SHIPPED | OUTPATIENT
Start: 2023-02-02
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
## 2023-04-27 ENCOUNTER — OFFICE VISIT (OUTPATIENT)
Dept: FAMILY MEDICINE CLINIC | Facility: CLINIC | Age: 53
End: 2023-04-27
Payer: COMMERCIAL

## 2023-04-27 VITALS
BODY MASS INDEX: 35.93 KG/M2 | HEART RATE: 90 BPM | WEIGHT: 223.6 LBS | OXYGEN SATURATION: 98 % | HEIGHT: 66 IN | SYSTOLIC BLOOD PRESSURE: 120 MMHG | DIASTOLIC BLOOD PRESSURE: 78 MMHG

## 2023-04-27 DIAGNOSIS — N95.1 HOT FLASHES DUE TO MENOPAUSE: ICD-10-CM

## 2023-04-27 DIAGNOSIS — G43.009 MIGRAINE WITHOUT AURA AND WITHOUT STATUS MIGRAINOSUS, NOT INTRACTABLE: Primary | ICD-10-CM

## 2023-04-27 RX ORDER — KETOROLAC TROMETHAMINE 30 MG/ML
30 INJECTION, SOLUTION INTRAMUSCULAR; INTRAVENOUS EVERY 6 HOURS PRN
Status: SHIPPED | OUTPATIENT
Start: 2023-04-27 | End: 2023-05-02

## 2023-04-27 RX ORDER — SUMATRIPTAN 50 MG/1
TABLET, FILM COATED ORAL
Qty: 12 TABLET | Refills: 0 | Status: SHIPPED | OUTPATIENT
Start: 2023-04-27

## 2023-04-27 RX ADMIN — KETOROLAC TROMETHAMINE 30 MG: 30 INJECTION, SOLUTION INTRAMUSCULAR; INTRAVENOUS at 13:56

## 2023-04-27 NOTE — PROGRESS NOTES
"Chief Complaint  Menopause (Pt states she was here awhile ago for this issue and she states it has gotten worse. Pt states she wants to discuss medication options.) and Migraine (Pt states shes had a migraine for 2 days . She states during the day it eases up but during the night it gets worse. She would like to discuss medication options also.)    Subjective        Francisca Adkins presents to [facility]  History of Present Illness  Pt is here today with a migraine that has been present for about 2 day. She reports migraine has been intermittent. She has a history of migraines. At one point, her pcp discussed triptan therapy with her and pt deferred at that time. She is interested in triptan therapy at this point. She states migraines are occurring more frequently and becoming more severe. She is a , and migraines are interfering with her ability to work.     Pt also reports she is having difficulty with hot flashes due to menopause. She would like to start hormone therapy. Advised pt I would discuss with her PCP considering she does have a history of migraines.     Objective   Vital Signs:  /78   Pulse 90   Ht 166.4 cm (65.51\")   Wt 101 kg (223 lb 9.6 oz)   SpO2 98%   BMI 36.63 kg/m²   Estimated body mass index is 36.63 kg/m² as calculated from the following:    Height as of this encounter: 166.4 cm (65.51\").    Weight as of this encounter: 101 kg (223 lb 9.6 oz).           Physical Exam  Vitals reviewed.   Constitutional:       Appearance: Normal appearance.   HENT:      Nose: Nose normal.      Mouth/Throat:      Pharynx: Oropharynx is clear.   Eyes:      Conjunctiva/sclera: Conjunctivae normal.   Cardiovascular:      Rate and Rhythm: Normal rate and regular rhythm.      Heart sounds: Normal heart sounds.   Pulmonary:      Effort: Pulmonary effort is normal.      Breath sounds: Normal breath sounds.   Musculoskeletal:         General: Normal range of motion. "   Skin:     General: Skin is warm.   Neurological:      Mental Status: She is alert and oriented to person, place, and time.   Psychiatric:         Mood and Affect: Mood normal.         Behavior: Behavior normal.         Thought Content: Thought content normal.        Result Review :                 Assessment and Plan   Diagnoses and all orders for this visit:    1. Migraine without aura and without status migrainosus, not intractable (Primary)  -     ketorolac (TORADOL) injection 30 mg  -     SUMAtriptan (Imitrex) 50 MG tablet; Take one tablet at onset of headache. May repeat dose one time in 2 hours if headache not relieved.  Dispense: 12 tablet; Refill: 0    2. Hot flashes due to menopause           Follow Up   No follow-ups on file.  Patient was given instructions and counseling regarding her condition or for health maintenance advice. Please see specific information pulled into the AVS if appropriate.

## 2023-09-20 ENCOUNTER — HOSPITAL ENCOUNTER (OUTPATIENT)
Dept: CT IMAGING | Facility: HOSPITAL | Age: 53
Discharge: HOME | End: 2023-09-20
Admitting: INTERNAL MEDICINE
Payer: COMMERCIAL

## 2023-09-20 DIAGNOSIS — R91.8 MULTIPLE PULMONARY NODULES: ICD-10-CM

## 2023-09-20 PROCEDURE — 71250 CT THORAX DX C-: CPT

## 2023-12-18 DIAGNOSIS — G43.009 MIGRAINE WITHOUT AURA AND WITHOUT STATUS MIGRAINOSUS, NOT INTRACTABLE: ICD-10-CM

## 2023-12-18 RX ORDER — SUMATRIPTAN 50 MG/1
TABLET, FILM COATED ORAL
Qty: 12 TABLET | Refills: 0 | Status: SHIPPED | OUTPATIENT
Start: 2023-12-18

## 2023-12-18 RX ORDER — ALBUTEROL SULFATE 90 UG/1
2 AEROSOL, METERED RESPIRATORY (INHALATION) EVERY 4 HOURS PRN
Qty: 18 G | Refills: 0 | Status: SHIPPED | OUTPATIENT
Start: 2023-12-18

## 2023-12-18 RX ORDER — SUMATRIPTAN 50 MG/1
TABLET, FILM COATED ORAL
Qty: 12 TABLET | Refills: 0 | Status: CANCELLED | OUTPATIENT
Start: 2023-12-18

## 2023-12-18 NOTE — TELEPHONE ENCOUNTER
Rx Refill Note  Requested Prescriptions     Pending Prescriptions Disp Refills    albuterol sulfate  (90 Base) MCG/ACT inhaler 18 g 2     Sig: Inhale 2 puffs Every 4 (Four) Hours As Needed for Wheezing or Shortness of Air.      Last office visit with prescribing clinician: 04/07/2023  Next office visit with prescribing clinician: Visit date not found     Shruthi Elizabeth MA  12/18/23, 11:17 EST    Last fill: 09/06/2022

## 2023-12-18 NOTE — TELEPHONE ENCOUNTER
Rx Refill Note  Requested Prescriptions     Pending Prescriptions Disp Refills    SUMAtriptan (Imitrex) 50 MG tablet 12 tablet 0     Sig: Take one tablet at onset of headache. May repeat dose one time in 2 hours if headache not relieved.      Last office visit with prescribing clinician: 4/27/2023   Last telemedicine visit with prescribing clinician: Visit date not found   Next office visit with prescribing clinician: Visit date not found                         Would you like a call back once the refill request has been completed: [] Yes [] No    If the office needs to give you a call back, can they leave a voicemail: [] Yes [] No    Bernice Carey Rep  12/18/23, 11:14 EST

## 2023-12-18 NOTE — TELEPHONE ENCOUNTER
Attempted to contact patient, no answer. Left voicemail for patient to call the office back (office # given).      Hub may relay message and schedule appointment.     Pt due for annual.           [employer] message also sent, pt has 2 refill encounter open.

## 2023-12-18 NOTE — TELEPHONE ENCOUNTER
Attempted to contact patient, no answer. Left voicemail for patient to call the office back (office # given).     Hub may relay message and schedule appointment.    Pt due for annual.   LOV with PCP 9/16/2022

## 2023-12-18 NOTE — TELEPHONE ENCOUNTER
Patient called back. Advised her of Shruthi's message. She said she would call back after the beginning of the year to schedule appt. Right now she just wants her inhaler. She's not feeling well and she's trying to keep from getting bronchitis.

## 2024-07-10 ENCOUNTER — OFFICE VISIT (OUTPATIENT)
Age: 54
End: 2024-07-10
Payer: COMMERCIAL

## 2024-07-10 ENCOUNTER — LAB (OUTPATIENT)
Age: 54
End: 2024-07-10
Payer: COMMERCIAL

## 2024-07-10 VITALS
OXYGEN SATURATION: 98 % | HEART RATE: 84 BPM | BODY MASS INDEX: 42.11 KG/M2 | DIASTOLIC BLOOD PRESSURE: 90 MMHG | SYSTOLIC BLOOD PRESSURE: 132 MMHG | HEIGHT: 66 IN | WEIGHT: 262 LBS

## 2024-07-10 DIAGNOSIS — E66.01 CLASS 3 SEVERE OBESITY DUE TO EXCESS CALORIES WITHOUT SERIOUS COMORBIDITY WITH BODY MASS INDEX (BMI) OF 40.0 TO 44.9 IN ADULT: ICD-10-CM

## 2024-07-10 DIAGNOSIS — F32.1 MAJOR DEPRESSIVE DISORDER, SINGLE EPISODE, MODERATE: ICD-10-CM

## 2024-07-10 DIAGNOSIS — M54.50 CHRONIC RIGHT-SIDED LOW BACK PAIN WITHOUT SCIATICA: ICD-10-CM

## 2024-07-10 DIAGNOSIS — N95.1 MENOPAUSAL SYMPTOMS: Primary | ICD-10-CM

## 2024-07-10 DIAGNOSIS — E66.01 MORBID OBESITY: Primary | ICD-10-CM

## 2024-07-10 DIAGNOSIS — M25.552 CHRONIC LEFT HIP PAIN: ICD-10-CM

## 2024-07-10 DIAGNOSIS — G89.29 CHRONIC LEFT HIP PAIN: ICD-10-CM

## 2024-07-10 DIAGNOSIS — G89.29 CHRONIC RIGHT-SIDED LOW BACK PAIN WITHOUT SCIATICA: ICD-10-CM

## 2024-07-10 DIAGNOSIS — F10.90 ALCOHOL USE DISORDER: ICD-10-CM

## 2024-07-10 PROCEDURE — 36415 COLL VENOUS BLD VENIPUNCTURE: CPT | Performed by: FAMILY MEDICINE

## 2024-07-10 PROCEDURE — 80053 COMPREHEN METABOLIC PANEL: CPT | Performed by: FAMILY MEDICINE

## 2024-07-10 PROCEDURE — 99214 OFFICE O/P EST MOD 30 MIN: CPT | Performed by: FAMILY MEDICINE

## 2024-07-10 PROCEDURE — 83036 HEMOGLOBIN GLYCOSYLATED A1C: CPT | Performed by: FAMILY MEDICINE

## 2024-07-10 PROCEDURE — 84443 ASSAY THYROID STIM HORMONE: CPT | Performed by: FAMILY MEDICINE

## 2024-07-10 RX ORDER — MELATONIN
2000 DAILY
COMMUNITY

## 2024-07-10 RX ORDER — UREA 10 %
1 LOTION (ML) TOPICAL DAILY
COMMUNITY

## 2024-07-10 RX ORDER — CELECOXIB 100 MG/1
100 CAPSULE ORAL DAILY PRN
Qty: 30 CAPSULE | Refills: 1 | Status: SHIPPED | OUTPATIENT
Start: 2024-07-10

## 2024-07-10 NOTE — PROGRESS NOTES
"Chief Complaint  Menopause    Subjective        Francisca Adkins presents to [facility]  History of Present Illness  Answers submitted by the patient for this visit:  Other (Submitted on 7/10/2024)  Please describe your symptoms.: Different issues with menopause  Have you had these symptoms before?: No  How long have you been having these symptoms?: Greater than 2 weeks  Please list any medications you are currently taking for this condition.: None  Please describe any probable cause for these symptoms. : Low estrogen  Primary Reason for Visit (Submitted on 7/10/2024)  What is the primary reason for your visit?: Other  Menopause symptoms for past 3 years. She had an ablation and stop having periods in her 30s. Weight gain and joint pain for several month concerned related to menopause. Hot flashes about 2 per day. Night sweats once this week. No vaginal dryness or painful intercourse. She has to lift her leg because lower back pain and left hip pain. Constant left hip pain.     She had prior gastric bypass. She doesn't eat late at night. She hardly eats but she gains weight. She has never tried weight loss medication.     She has no energy and drinking a lot of alcohol. About 1/5 per day.         Objective   Vital Signs:  /90   Pulse 84   Ht 166.4 cm (65.5\")   Wt 119 kg (262 lb)   SpO2 98%   BMI 42.94 kg/m²   Estimated body mass index is 42.94 kg/m² as calculated from the following:    Height as of this encounter: 166.4 cm (65.5\").    Weight as of this encounter: 119 kg (262 lb).             Physical Exam  Vitals reviewed.   Constitutional:       General: She is not in acute distress.     Appearance: She is morbidly obese. She is not ill-appearing.   Cardiovascular:      Rate and Rhythm: Normal rate and regular rhythm.   Pulmonary:      Effort: Pulmonary effort is normal.      Breath sounds: Normal breath sounds.   Musculoskeletal:      Lumbar back: No bony " tenderness. Negative left straight leg raise test.        Back:       Left hip: No tenderness or bony tenderness. Normal range of motion.      Comments: Left hip pain increased with internal rotation   Neurological:      Mental Status: She is alert.   Psychiatric:         Mood and Affect: Mood is depressed.        Result Review :                     Assessment and Plan     Diagnoses and all orders for this visit:    1. Menopausal symptoms (Primary)  Discussed with patient her predominant symptoms of joint pain and weight gain would not likely improve with hormone replacement therapy.   2. Chronic left hip pain  -     XR Hip With or Without Pelvis 2 - 3 View Left; Future  -     celecoxib (CeleBREX) 100 MG capsule; Take 1 capsule by mouth Daily As Needed for Moderate Pain.  Dispense: 30 capsule; Refill: 1  New.  Check x-rays today.  Start Celebrex.  Reassess next month.  3. Chronic right-sided low back pain without sciatica  -     XR Spine Lumbar 2 or 3 View; Future  -     celecoxib (CeleBREX) 100 MG capsule; Take 1 capsule by mouth Daily As Needed for Moderate Pain.  Dispense: 30 capsule; Refill: 1  New.  Check x-rays today.  Start Celebrex.  Reassess next month.  4. Class 3 severe obesity due to excess calories without serious comorbidity with body mass index (BMI) of 40.0 to 44.9 in adult  -     Hemoglobin A1c; Future  -     TSH Rfx On Abnormal To Free T4; Future  -     Comprehensive Metabolic Panel; Future  Class 3 Severe Obesity (BMI >=40). Obesity-related health conditions include the following: none. Obesity is worsening. BMI is is above average; BMI management plan is completed. We discussed pharmacologic options including Wegovy, Zepbound, Contrave .    5. Alcohol use disorder  -     Ambulatory Referral to Behavioral Health  I recommended patient to be seen for alcohol use disorder for recovery which she deferred.  She would like to start with counseling.  6. Major depressive disorder, single episode,  moderate  -     Ambulatory Referral to Behavioral Health  Began counseling as above.           Follow Up     Return in about 1 month (around 8/10/2024) for Office visit back pain, joint pain .  Patient was given instructions and counseling regarding her condition or for health maintenance advice. Please see specific information pulled into the AVS if appropriate.     Electronically signed by Marti Saez MD, 07/10/24, 2:45 PM EDT.

## 2024-07-11 LAB
ALBUMIN SERPL-MCNC: 3.9 G/DL (ref 3.5–5.2)
ALBUMIN/GLOB SERPL: 0.9 G/DL
ALP SERPL-CCNC: 204 U/L (ref 39–117)
ALT SERPL W P-5'-P-CCNC: 69 U/L (ref 1–33)
ANION GAP SERPL CALCULATED.3IONS-SCNC: 14.5 MMOL/L (ref 5–15)
AST SERPL-CCNC: 63 U/L (ref 1–32)
BILIRUB SERPL-MCNC: 0.4 MG/DL (ref 0–1.2)
BUN SERPL-MCNC: 11 MG/DL (ref 6–20)
BUN/CREAT SERPL: 12.9 (ref 7–25)
CALCIUM SPEC-SCNC: 8.9 MG/DL (ref 8.6–10.5)
CHLORIDE SERPL-SCNC: 99 MMOL/L (ref 98–107)
CO2 SERPL-SCNC: 22.5 MMOL/L (ref 22–29)
CREAT SERPL-MCNC: 0.85 MG/DL (ref 0.57–1)
EGFRCR SERPLBLD CKD-EPI 2021: 82 ML/MIN/1.73
GLOBULIN UR ELPH-MCNC: 4.3 GM/DL
GLUCOSE SERPL-MCNC: 97 MG/DL (ref 65–99)
HBA1C MFR BLD: 6 % (ref 4.8–5.6)
POTASSIUM SERPL-SCNC: 3.8 MMOL/L (ref 3.5–5.2)
PROT SERPL-MCNC: 8.2 G/DL (ref 6–8.5)
SODIUM SERPL-SCNC: 136 MMOL/L (ref 136–145)
TSH SERPL DL<=0.05 MIU/L-ACNC: 3.28 UIU/ML (ref 0.27–4.2)

## 2024-08-14 ENCOUNTER — OFFICE VISIT (OUTPATIENT)
Age: 54
End: 2024-08-14
Payer: COMMERCIAL

## 2024-08-14 VITALS
WEIGHT: 257 LBS | HEIGHT: 66 IN | DIASTOLIC BLOOD PRESSURE: 86 MMHG | OXYGEN SATURATION: 100 % | BODY MASS INDEX: 41.3 KG/M2 | HEART RATE: 80 BPM | SYSTOLIC BLOOD PRESSURE: 130 MMHG

## 2024-08-14 DIAGNOSIS — M54.50 CHRONIC RIGHT-SIDED LOW BACK PAIN WITHOUT SCIATICA: ICD-10-CM

## 2024-08-14 DIAGNOSIS — G89.29 CHRONIC LEFT HIP PAIN: ICD-10-CM

## 2024-08-14 DIAGNOSIS — G89.29 CHRONIC RIGHT-SIDED LOW BACK PAIN WITHOUT SCIATICA: ICD-10-CM

## 2024-08-14 DIAGNOSIS — E66.01 CLASS 3 SEVERE OBESITY DUE TO EXCESS CALORIES WITH SERIOUS COMORBIDITY AND BODY MASS INDEX (BMI) OF 40.0 TO 44.9 IN ADULT: ICD-10-CM

## 2024-08-14 DIAGNOSIS — M25.552 CHRONIC LEFT HIP PAIN: ICD-10-CM

## 2024-08-14 DIAGNOSIS — R73.03 PREDIABETES: Primary | ICD-10-CM

## 2024-08-14 PROBLEM — E66.9 OBESITY (BMI 35.0-39.9 WITHOUT COMORBIDITY): Status: RESOLVED | Noted: 2020-02-06 | Resolved: 2024-08-14

## 2024-08-14 PROCEDURE — 99214 OFFICE O/P EST MOD 30 MIN: CPT | Performed by: FAMILY MEDICINE

## 2024-08-14 RX ORDER — IBUPROFEN 600 MG/1
600 TABLET ORAL EVERY 8 HOURS PRN
Qty: 90 TABLET | Refills: 3 | Status: SHIPPED | OUTPATIENT
Start: 2024-08-14

## 2024-08-14 RX ORDER — IBUPROFEN 600 MG/1
600 TABLET ORAL EVERY 6 HOURS PRN
COMMUNITY
End: 2024-08-14

## 2024-08-14 NOTE — PROGRESS NOTES
"Chief Complaint  Pain (Joint pain) and Prediabetes    Subjective        Francisca Adkins presents to [facility]  History of Present Illness  Answers submitted by the patient for this visit:  Other (Submitted on 8/7/2024)  Please describe your symptoms.: Menopause hot flashes and night sweats  Have you had these symptoms before?: Yes  How long have you been having these symptoms?: Greater than 2 weeks  Please describe any probable cause for these symptoms. : Menopause  Primary Reason for Visit (Submitted on 8/7/2024)  What is the primary reason for your visit?: Other    She eating better and lost 5 lbs. She has more fruits. Vegetables every dinner. Not eating after 630 pm. She drinks water, tea or cranberry juice. She cut back from 2-3 pepsi per day and now 2 per week. She hasn't been drinking. Her last drink was the same day as her last appointment. She has cravings and she prays her way through it. She is walking more.     Celebrex didn't help after 2 weeks. Ibuprofen 600mg helps her pain once every other day.     She is not having major hot flashes or night sweats. No migraines. Energy level is picking up.       Objective   Vital Signs:  /86   Pulse 80   Ht 166.4 cm (65.5\")   Wt 117 kg (257 lb)   SpO2 100%   BMI 42.12 kg/m²   Estimated body mass index is 42.12 kg/m² as calculated from the following:    Height as of this encounter: 166.4 cm (65.5\").    Weight as of this encounter: 117 kg (257 lb).            Physical Exam  Vitals reviewed.   Constitutional:       General: She is not in acute distress.     Appearance: She is morbidly obese. She is not ill-appearing.   Cardiovascular:      Rate and Rhythm: Normal rate and regular rhythm.   Pulmonary:      Effort: Pulmonary effort is normal.      Breath sounds: Normal breath sounds.   Neurological:      Mental Status: She is alert.      Gait: Gait normal.   Psychiatric:         Mood and Affect: Mood normal.      "   Result Review :  The following data was reviewed by: Marti Saez MD on 08/14/2024:  Common labs          7/10/2024    14:53   Common Labs   Glucose 97    BUN 11    Creatinine 0.85    Sodium 136    Potassium 3.8    Chloride 99    Calcium 8.9    Albumin 3.9    Total Bilirubin 0.4    Alkaline Phosphatase 204    AST (SGOT) 63    ALT (SGPT) 69    Hemoglobin A1C 6.00      A1C Last 3 Results          7/10/2024    14:53   HGBA1C Last 3 Results   Hemoglobin A1C 6.00                Assessment and Plan   Diagnoses and all orders for this visit:    1. Prediabetes (Primary)  New.  She has started lifestyle changes through diet and exercise.  Recheck in 3 months.  2. Chronic left hip pain  -     ibuprofen (ADVIL,MOTRIN) 600 MG tablet; Take 1 tablet by mouth Every 8 (Eight) Hours As Needed for Moderate Pain.  Dispense: 90 tablet; Refill: 3  Inadequate relief with Celebrex.  Pain better controlled with ibuprofen as needed.  3. Chronic right-sided low back pain without sciatica  -     ibuprofen (ADVIL,MOTRIN) 600 MG tablet; Take 1 tablet by mouth Every 8 (Eight) Hours As Needed for Moderate Pain.  Dispense: 90 tablet; Refill: 3  Inadequate relief with Celebrex.  Pain better controlled with ibuprofen as needed.  4. Class 3 severe obesity due to excess calories with serious comorbidity and body mass index (BMI) of 40.0 to 44.9 in adult  Class 3 Severe Obesity (BMI >=40). Obesity-related health conditions include the following:  prediabetes . Obesity is improving with lifestyle modifications. BMI is is above average; BMI management plan is completed. We discussed low calorie, low carb based diet program, increasing exercise, and decreasing alcohol consumption.             Follow Up   Return in about 3 months (around 11/14/2024) for Office visit prediabtes.  Patient was given instructions and counseling regarding her condition or for health maintenance advice. Please see specific information pulled into the AVS if appropriate.        Electronically signed by Marti Saez MD, 08/14/24, 4:03 PM EDT.

## 2024-11-14 PROBLEM — R73.03 PREDIABETES: Status: ACTIVE | Noted: 2024-11-14

## 2024-12-02 ENCOUNTER — OFFICE VISIT (OUTPATIENT)
Age: 54
End: 2024-12-02
Payer: COMMERCIAL

## 2024-12-02 VITALS
DIASTOLIC BLOOD PRESSURE: 80 MMHG | RESPIRATION RATE: 18 BRPM | OXYGEN SATURATION: 98 % | WEIGHT: 253.19 LBS | HEART RATE: 76 BPM | BODY MASS INDEX: 40.69 KG/M2 | HEIGHT: 66 IN | SYSTOLIC BLOOD PRESSURE: 130 MMHG

## 2024-12-02 DIAGNOSIS — R73.03 PREDIABETES: Primary | ICD-10-CM

## 2024-12-02 DIAGNOSIS — M25.552 CHRONIC LEFT HIP PAIN: ICD-10-CM

## 2024-12-02 DIAGNOSIS — E66.01 CLASS 3 SEVERE OBESITY DUE TO EXCESS CALORIES WITH SERIOUS COMORBIDITY AND BODY MASS INDEX (BMI) OF 40.0 TO 44.9 IN ADULT: ICD-10-CM

## 2024-12-02 DIAGNOSIS — E66.813 CLASS 3 SEVERE OBESITY DUE TO EXCESS CALORIES WITH SERIOUS COMORBIDITY AND BODY MASS INDEX (BMI) OF 40.0 TO 44.9 IN ADULT: ICD-10-CM

## 2024-12-02 DIAGNOSIS — G89.29 CHRONIC LEFT HIP PAIN: ICD-10-CM

## 2024-12-02 LAB
EXPIRATION DATE: ABNORMAL
HBA1C MFR BLD: 5.9 % (ref 4.5–5.7)
Lab: ABNORMAL

## 2024-12-02 PROCEDURE — 99214 OFFICE O/P EST MOD 30 MIN: CPT | Performed by: FAMILY MEDICINE

## 2024-12-02 PROCEDURE — 83036 HEMOGLOBIN GLYCOSYLATED A1C: CPT | Performed by: FAMILY MEDICINE

## 2024-12-02 NOTE — PROGRESS NOTES
"Chief Complaint  Prediabetes    Subjective        Francisca Adkins presents to [facility]  Today's concern : Recheck on weight.   Symptoms are recurrent.   Onset was at an unknown time.   Symptoms occur intermittently. Pertinent negative symptoms include no abdominal pain, no anorexia, no joint pain, no change in stool, no chest pain, no chills, no congestion, no cough, no diaphoresis, no fatigue, no fever, no headaches, no joint swelling, no myalgias, no nausea, no neck pain, no numbness, no rash, no sore throat, no swollen glands, no dysuria, no vertigo, no visual change, no vomiting and no weakness.   Treatment and/or Medications comments include: N/a   Answers submitted by the patient for this visit:  Primary Reason for Visit (Submitted on 12/2/2024)  What is the primary reason for your visit?: Problem Not Listed    History of Present Illness  The patient is a 54-year-old female presenting for follow-up prediabetes.    She continues to experience pain in her left hip, which has led her to request a handicap sticker due to the difficulty she faces in climbing stairs. She is currently managing the pain with ibuprofen. She has also been making efforts to reduce her soda intake, opting for lighter alternatives such as ginger ale, and increasing her water consumption.  She recently was on a cruise and with the holidays was eating differently.    Objective   Vital Signs:  /80   Pulse 76   Resp 18   Ht 166.4 cm (65.51\")   Wt 115 kg (253 lb 3 oz)   SpO2 98%   BMI 41.48 kg/m²   Estimated body mass index is 41.48 kg/m² as calculated from the following:    Height as of this encounter: 166.4 cm (65.51\").    Weight as of this encounter: 115 kg (253 lb 3 oz).            The following portions of the patient's history were reviewed and updated as appropriate: allergies, current medications, past family history, past medical history, past social history, past surgical history, " and problem list.       Physical Exam  Vitals reviewed.   Constitutional:       General: She is not in acute distress.     Appearance: She is morbidly obese. She is not ill-appearing.   Cardiovascular:      Rate and Rhythm: Normal rate and regular rhythm.   Pulmonary:      Effort: Pulmonary effort is normal.      Breath sounds: Normal breath sounds.   Musculoskeletal:      Left hip: No tenderness or bony tenderness.      Comments: Slow to rise from seated to standing   Neurological:      Mental Status: She is alert.        Result Review :  The following data was reviewed by: Marti Saez MD on 12/02/2024:  Common labs          7/10/2024    14:53 12/2/2024    13:11   Common Labs   Glucose 97     BUN 11     Creatinine 0.85     Sodium 136     Potassium 3.8     Chloride 99     Calcium 8.9     Albumin 3.9     Total Bilirubin 0.4     Alkaline Phosphatase 204     AST (SGOT) 63     ALT (SGPT) 69     Hemoglobin A1C 6.00  5.9      A1C Last 3 Results          7/10/2024    14:53 12/2/2024    13:11   HGBA1C Last 3 Results   Hemoglobin A1C 6.00  5.9           XR Hip With or Without Pelvis 2 - 3 View Left (07/10/2024 14:59)  XR Spine Lumbar 2 or 3 View (07/10/2024 14:59)     Assessment and Plan   Diagnoses and all orders for this visit:    1. Prediabetes (Primary)  -     POC Glycosylated Hemoglobin (Hb A1C)    2. Class 3 severe obesity due to excess calories with serious comorbidity and body mass index (BMI) of 40.0 to 44.9 in adult    3. Chronic left hip pain  -     Ambulatory Referral to Orthopedic Surgery         Class 3 Severe Obesity (BMI >=40). Obesity-related health conditions include the following:  prediabetes . Obesity is improving with lifestyle modifications. BMI is is above average; BMI management plan is completed. We discussed low calorie, low carb based diet program.      Assessment & Plan  1. Prediabetes.  Her A1c level has shown improvement, decreasing from 6 in July  to 5.9 currently. She has been able to  cut back on soda intake, which has positively impacted her A1c levels. She is advised to continue reducing her soda consumption and maintain a balanced diet.  Recheck in 6 months    2. Left hip pain.  She continues to experience chronic pain in her left hip, which affects her daily activities. She reports difficulty climbing stairs and getting in and out of the car. She is currently using ibuprofen for pain management. A referral to orthopedics has been made for further evaluation and management of her hip pain.  I declined to fill out the disabled parking permit as she is not disabled.    Follow-up  Return in 6 months for follow-up.      Follow Up   Return in about 6 months (around 6/2/2025) for Physical and fasting labs.  Patient was given instructions and counseling regarding her condition or for health maintenance advice. Please see specific information pulled into the AVS if appropriate.         Patient or patient representative verbalized consent for the use of Ambient Listening during the visit with  Marti Saez MD for chart documentation. 12/2/2024  13:16 EST        Electronically signed by Marti Saez MD, 12/02/24, 1:16 PM EST.